# Patient Record
Sex: FEMALE | Race: BLACK OR AFRICAN AMERICAN | NOT HISPANIC OR LATINO | Employment: FULL TIME | URBAN - METROPOLITAN AREA
[De-identification: names, ages, dates, MRNs, and addresses within clinical notes are randomized per-mention and may not be internally consistent; named-entity substitution may affect disease eponyms.]

---

## 2023-04-26 ENCOUNTER — OFFICE VISIT (OUTPATIENT)
Dept: PRIMARY CARE | Facility: CLINIC | Age: 48
End: 2023-04-26
Payer: COMMERCIAL

## 2023-04-26 VITALS
BODY MASS INDEX: 39.16 KG/M2 | HEART RATE: 82 BPM | DIASTOLIC BLOOD PRESSURE: 78 MMHG | SYSTOLIC BLOOD PRESSURE: 135 MMHG | WEIGHT: 250 LBS

## 2023-04-26 DIAGNOSIS — E11.9 TYPE 2 DIABETES MELLITUS WITHOUT COMPLICATION, WITHOUT LONG-TERM CURRENT USE OF INSULIN (MULTI): Primary | ICD-10-CM

## 2023-04-26 DIAGNOSIS — E66.01 OBESITY, MORBID (MULTI): ICD-10-CM

## 2023-04-26 DIAGNOSIS — I10 ESSENTIAL HYPERTENSION: ICD-10-CM

## 2023-04-26 DIAGNOSIS — E11.9 DIABETES MELLITUS TYPE 2 WITHOUT RETINOPATHY (MULTI): ICD-10-CM

## 2023-04-26 PROBLEM — K21.9 CHRONIC GERD: Status: ACTIVE | Noted: 2023-04-26

## 2023-04-26 PROBLEM — M17.12 ARTHRITIS OF LEFT KNEE: Status: ACTIVE | Noted: 2023-04-26

## 2023-04-26 LAB
ALBUMIN (MG/L) IN URINE: 15 MG/L
ALBUMIN/CREATININE (UG/MG) IN URINE: 15.5 UG/MG CRT (ref 0–30)
CREATININE (MG/DL) IN URINE: 97 MG/DL (ref 20–320)
ESTIMATED AVERAGE GLUCOSE FOR HBA1C: 232 MG/DL
HEMOGLOBIN A1C/HEMOGLOBIN TOTAL IN BLOOD: 9.7 %

## 2023-04-26 PROCEDURE — 99214 OFFICE O/P EST MOD 30 MIN: CPT | Performed by: INTERNAL MEDICINE

## 2023-04-26 PROCEDURE — 1036F TOBACCO NON-USER: CPT | Performed by: INTERNAL MEDICINE

## 2023-04-26 PROCEDURE — 3008F BODY MASS INDEX DOCD: CPT | Performed by: INTERNAL MEDICINE

## 2023-04-26 PROCEDURE — 82043 UR ALBUMIN QUANTITATIVE: CPT

## 2023-04-26 PROCEDURE — 4010F ACE/ARB THERAPY RXD/TAKEN: CPT | Performed by: INTERNAL MEDICINE

## 2023-04-26 PROCEDURE — 82570 ASSAY OF URINE CREATININE: CPT

## 2023-04-26 PROCEDURE — 3075F SYST BP GE 130 - 139MM HG: CPT | Performed by: INTERNAL MEDICINE

## 2023-04-26 PROCEDURE — 36415 COLL VENOUS BLD VENIPUNCTURE: CPT | Performed by: INTERNAL MEDICINE

## 2023-04-26 PROCEDURE — 3078F DIAST BP <80 MM HG: CPT | Performed by: INTERNAL MEDICINE

## 2023-04-26 PROCEDURE — 83036 HEMOGLOBIN GLYCOSYLATED A1C: CPT

## 2023-04-26 PROCEDURE — 3046F HEMOGLOBIN A1C LEVEL >9.0%: CPT | Performed by: INTERNAL MEDICINE

## 2023-04-26 RX ORDER — OMEPRAZOLE 40 MG/1
1 CAPSULE, DELAYED RELEASE ORAL DAILY
COMMUNITY
Start: 2021-02-24 | End: 2023-12-21 | Stop reason: ALTCHOICE

## 2023-04-26 RX ORDER — ATORVASTATIN CALCIUM 10 MG/1
10 TABLET, FILM COATED ORAL DAILY
COMMUNITY
Start: 2021-02-24 | End: 2023-12-21 | Stop reason: ALTCHOICE

## 2023-04-26 RX ORDER — PEN NEEDLE, DIABETIC 30 GX3/16"
NEEDLE, DISPOSABLE MISCELLANEOUS
Qty: 100 EACH | Refills: 1 | Status: SHIPPED | OUTPATIENT
Start: 2023-04-26 | End: 2024-04-25

## 2023-04-26 RX ORDER — METFORMIN HYDROCHLORIDE 500 MG/1
500 TABLET ORAL
COMMUNITY
Start: 2021-01-31

## 2023-04-26 RX ORDER — LOSARTAN POTASSIUM 25 MG/1
1 TABLET ORAL DAILY
COMMUNITY
Start: 2021-02-24 | End: 2023-12-21 | Stop reason: ALTCHOICE

## 2023-04-26 ASSESSMENT — PATIENT HEALTH QUESTIONNAIRE - PHQ9
2. FEELING DOWN, DEPRESSED OR HOPELESS: NOT AT ALL
1. LITTLE INTEREST OR PLEASURE IN DOING THINGS: NOT AT ALL
SUM OF ALL RESPONSES TO PHQ9 QUESTIONS 1 AND 2: 0

## 2023-04-26 NOTE — LETTER
May 25, 2023     Bambi Jose  [address]  [facility] [address]      Dear Ms. Garcia:    Below are the results from your recent visit:    Resulted Orders   Hemoglobin A1C   Result Value Ref Range    Hemoglobin A1C 9.7 (A) %      Comment:           Diagnosis of Diabetes-Adults   Non-Diabetic: < or = 5.6%   Increased risk for developing diabetes: 5.7-6.4%   Diagnostic of diabetes: > or = 6.5%  .       Monitoring of Diabetes                Age (y)     Therapeutic Goal (%)   Adults:          >18           <7.0   Pediatrics:    13-18           <7.5                   7-12           <8.0                   0- 6            7.5-8.5   American Diabetes Association. Diabetes Care 33(S1), Jan 2010.    Estimated Average Glucose 232 MG/DL   Albumin , Urine Random   Result Value Ref Range    ALBUMIN (MG/L) IN URINE 15.0 Not Established mg/L    Albumin/Creatine Ratio 15.5 0.0 - 30.0 ug/mg crt    Creatinine, Urine 97.0 20.0 - 320.0 mg/dL     The test results show that your current treatment is working. Please {Therapies; lab letter directions:83737}. We recommend that you repeat the above test(s) in {Numbers; 1-10:93845} {Time; units w/plural:11}.    If you have any questions or concerns, please don't hesitate to call.         Sincerely,        Jake Santos, DO

## 2023-04-26 NOTE — ASSESSMENT & PLAN NOTE
Uncontrolled here for follow-up.  We will check A1c.  Advised to continue checking point-of-care glucose at home.  Its been elevated at home.  Anticipate the A1c being uncontrolled today.  We will start Ozempic in addition to the metformin.  Discussed side effects including increased risk of GI disturbances and pancreatic cancer as well as pancreatitis.  Follow-up 2 months

## 2023-04-26 NOTE — PROGRESS NOTES
Subjective   Patient ID: Bambi Garcia is a 47 y.o. female who presents for Follow-up (Patient wanted to discuss wt loss options ).    HPI     Patient is a 47-year-old female with past medical history of hypertension chronic GERD dyslipidemia obesity and diabetes mellitus type 2 presents for follow-up    Diabetes mellitus type 2.  Currently uncontrolled.  No increased thirst or increased urination.  She is compliant with metformin.  Point-of-care glucose of but has been consistently above 150 at home.  She tries to stick with a low carbohydrate diet.  No regular exercise.  No significant weight reduction at this time.      Hypertension well-controlled on current medications denies headache changes in vision orthopnea.  No side effects of medications and no compliance issues.    Chronic GERD stable controlled on Prilosec.        Review of Systems  Constitutional: No fever or chills  Cardiovascular: no chest pain, no palpitations and no syncope.   Respiratory: no cough, no shortness of breath during exertion and no shortness of breath at rest.   Gastrointestinal: no abdominal pain, no nausea and no vomiting.  Neuro: No Headache, no dizziness    Objective   /78   Pulse 82   Wt 113 kg (250 lb)   BMI 39.16 kg/m²     Physical Exam  Constitutional: Alert and in no acute distress. Well developed, well nourished  Head and Face: Head and face: Normal.    Cardiovascular: Heart rate and rhythm were normal, normal S1 and S2. No peripheral edema.   Pulmonary: No respiratory distress. Clear bilateral breath sounds.  Musculoskeletal: Gait and station: Normal. Muscle strength/tone: Normal.   Skin: Normal skin color and pigmentation, normal skin turgor, and no rash.    Psychiatric: Judgment and insight: Intact. Mood and affect: Normal.        Lab Results   Component Value Date    WBC 9.6 11/30/2022    HGB 13.4 11/30/2022    HCT 41.1 11/30/2022     11/30/2022    CHOL 211 (H) 11/30/2022    TRIG 96 11/30/2022    HDL  57.8 11/30/2022    ALT 11 11/30/2022    AST 14 11/30/2022     11/30/2022    K 4.5 11/30/2022     11/30/2022    CREATININE 0.72 11/30/2022    BUN 12 11/30/2022    CO2 23 11/30/2022    TSH 1.61 01/21/2021    HGBA1C 9.3 (A) 11/30/2022       BI mammo bilateral screening tomosynthesis  MRN: [id number]  Patient Name: KRISTEN MALCOLM     STUDY:  DIGITAL MAMM SCREENING W/ DEISY;  12/21/2022 12:10 pm     ACCESSION NUMBER(S):  [id number]     ORDERING CLINICIAN:  LESLIE KESSLER     INDICATION:  Screening.     COMPARISON:  01/26/2021     FINDINGS:  2D and tomosynthesis images were reviewed at 1 mm slice thickness.     There are areas of scattered fibroglandular tissue.  There is a focal  asymmetry in the left upper inner quadrant of the breast. No  suspicious calcifications are identified.     IMPRESSION:  Focal asymmetry in the left breast.     Further evaluation with diagnostic left breast mammogram and left  breast ultrasound is suggested     BI-RADS CATEGORY:     Category: 0 - Incomplete; Need Additional Imaging Evaluation.  Recommendation: Additional Views with diagnostic left breast  mammogram and left breast ultrasound.     For any future breast imaging appointments, please call [phone]).                        Assessment/Plan   Problem List Items Addressed This Visit          Circulatory    Essential hypertension     Controlled on current medications.  No medication adjustments            Endocrine/Metabolic    Diabetes (CMS/HCC) - Primary    Relevant Medications    semaglutide (Ozempic) 0.25 mg or 0.5 mg(2 mg/1.5 mL) pen injector    Other Relevant Orders    Hemoglobin A1C    Albumin , Urine Random    Diabetes mellitus type 2 without retinopathy (CMS/HCC)     Uncontrolled here for follow-up.  We will check A1c.  Advised to continue checking point-of-care glucose at home.  Its been elevated at home.  Anticipate the A1c being uncontrolled today.  We will start Ozempic in addition to the metformin.   Discussed side effects including increased risk of GI disturbances and pancreatic cancer as well as pancreatitis.  Follow-up 2 months         Obesity, morbid (CMS/HCC)     Plan to start Ozempic.  Encouraged regular exercise and low calorie diet

## 2023-07-20 ENCOUNTER — OFFICE VISIT (OUTPATIENT)
Dept: PRIMARY CARE | Facility: CLINIC | Age: 48
End: 2023-07-20
Payer: COMMERCIAL

## 2023-07-20 VITALS
SYSTOLIC BLOOD PRESSURE: 115 MMHG | HEART RATE: 92 BPM | OXYGEN SATURATION: 96 % | BODY MASS INDEX: 38.19 KG/M2 | HEIGHT: 68 IN | WEIGHT: 252 LBS | DIASTOLIC BLOOD PRESSURE: 75 MMHG

## 2023-07-20 DIAGNOSIS — E11.9 TYPE 2 DIABETES MELLITUS WITHOUT COMPLICATION, WITHOUT LONG-TERM CURRENT USE OF INSULIN (MULTI): ICD-10-CM

## 2023-07-20 DIAGNOSIS — I10 ESSENTIAL HYPERTENSION: ICD-10-CM

## 2023-07-20 DIAGNOSIS — E11.9 DIABETES MELLITUS TYPE 2 WITHOUT RETINOPATHY (MULTI): Primary | ICD-10-CM

## 2023-07-20 PROCEDURE — 99214 OFFICE O/P EST MOD 30 MIN: CPT | Performed by: INTERNAL MEDICINE

## 2023-07-20 PROCEDURE — 1036F TOBACCO NON-USER: CPT | Performed by: INTERNAL MEDICINE

## 2023-07-20 PROCEDURE — 3008F BODY MASS INDEX DOCD: CPT | Performed by: INTERNAL MEDICINE

## 2023-07-20 PROCEDURE — 3074F SYST BP LT 130 MM HG: CPT | Performed by: INTERNAL MEDICINE

## 2023-07-20 PROCEDURE — 3078F DIAST BP <80 MM HG: CPT | Performed by: INTERNAL MEDICINE

## 2023-07-20 PROCEDURE — 3046F HEMOGLOBIN A1C LEVEL >9.0%: CPT | Performed by: INTERNAL MEDICINE

## 2023-07-20 PROCEDURE — 4010F ACE/ARB THERAPY RXD/TAKEN: CPT | Performed by: INTERNAL MEDICINE

## 2023-07-20 ASSESSMENT — ENCOUNTER SYMPTOMS
LOSS OF SENSATION IN FEET: 0
DEPRESSION: 0
OCCASIONAL FEELINGS OF UNSTEADINESS: 0

## 2023-07-20 ASSESSMENT — PATIENT HEALTH QUESTIONNAIRE - PHQ9
SUM OF ALL RESPONSES TO PHQ9 QUESTIONS 1 AND 2: 0
2. FEELING DOWN, DEPRESSED OR HOPELESS: NOT AT ALL
1. LITTLE INTEREST OR PLEASURE IN DOING THINGS: NOT AT ALL

## 2023-07-20 NOTE — ASSESSMENT & PLAN NOTE
Likely uncontrolled as she has not been taking her medications as prescribed.  We will refill the Ozempic and check A1c next visit.  Explained to the patient she should be taking 0.25 mg first 4 weeks.  Then increase to 0.5 mg.  If you cannot get refills on the prescription please let this provider know.  Advise low carbohydrate diet and we will recheck A1c next visit.  Follow-up 2 months

## 2023-07-20 NOTE — PROGRESS NOTES
"Subjective   Patient ID: Bambi Garcia is a 47 y.o. female who presents for Follow-up.    HPI     Patient is a 47-year-old female with past medical history of GERD diabetes and hypertension who presents for follow-up.    Patient has uncontrolled diabetes mellitus type 2.  She is supposed to be taking Ozempic however she took it for the first 6 weeks and stopped.  Is unclear as to why.  She states she did not have any refills.  She never reached out to the provider for refills.  She has not been back since the initial prescription.  She been taking her metformin however.  Her last A1c was greater than 9.  She has not been checking her sugars at home.  She tries to eat a low carbohydrate diet.    Hypertension currently well controlled on current medications denies headache changes in vision orthopnea    Dyslipidemia on statin therapy.  Due for LDL recheck next visit    Review of Systems  Constitutional: No fever or chills  Cardiovascular: no chest pain, no palpitations and no syncope.   Respiratory: no cough, no shortness of breath during exertion and no shortness of breath at rest.   Gastrointestinal: no abdominal pain, no nausea and no vomiting.  Neuro: No Headache, no dizziness    Objective   /75   Pulse 92   Ht 1.727 m (5' 8\")   Wt 114 kg (252 lb)   SpO2 96%   BMI 38.32 kg/m²     Physical Exam  Constitutional: Alert and in no acute distress. Well developed, well nourished  Head and Face: Head and face: Normal.    Cardiovascular: Heart rate and rhythm were normal, normal S1 and S2. No peripheral edema.   Pulmonary: No respiratory distress. Clear bilateral breath sounds.  Musculoskeletal: Gait and station: Normal. Muscle strength/tone: Normal.   Skin: Normal skin color and pigmentation, normal skin turgor, and no rash.    Psychiatric: Judgment and insight: Intact. Mood and affect: Normal.        Lab Results   Component Value Date    WBC 9.6 11/30/2022    HGB 13.4 11/30/2022    HCT 41.1 11/30/2022    PLT " 428 11/30/2022    CHOL 211 (H) 11/30/2022    TRIG 96 11/30/2022    HDL 57.8 11/30/2022    ALT 11 11/30/2022    AST 14 11/30/2022     11/30/2022    K 4.5 11/30/2022     11/30/2022    CREATININE 0.72 11/30/2022    BUN 12 11/30/2022    CO2 23 11/30/2022    TSH 1.61 01/21/2021    HGBA1C 9.7 (A) 04/26/2023       OUTSIDE IMAGING SCAN  Ordered by an unspecified provider.            Assessment/Plan   Problem List Items Addressed This Visit          Cardiac and Vasculature    Essential hypertension     Controlled at this time on current medications.  No medication adjustments.            Endocrine/Metabolic    Diabetes (CMS/HCC)    Relevant Medications    semaglutide (Ozempic) 0.25 mg or 0.5 mg(2 mg/1.5 mL) pen injector    Diabetes mellitus type 2 without retinopathy (CMS/HCC) - Primary     Likely uncontrolled as she has not been taking her medications as prescribed.  We will refill the Ozempic and check A1c next visit.  Explained to the patient she should be taking 0.25 mg first 4 weeks.  Then increase to 0.5 mg.  If you cannot get refills on the prescription please let this provider know.  Advise low carbohydrate diet and we will recheck A1c next visit.  Follow-up 2 months

## 2023-08-29 PROBLEM — E11.65 UNCONTROLLED TYPE 2 DIABETES MELLITUS WITH HYPERGLYCEMIA (MULTI): Status: ACTIVE | Noted: 2023-08-29

## 2023-08-29 PROBLEM — M25.562 CHRONIC PAIN OF LEFT KNEE: Status: ACTIVE | Noted: 2023-08-29

## 2023-08-29 PROBLEM — G89.29 CHRONIC PAIN OF LEFT KNEE: Status: ACTIVE | Noted: 2023-08-29

## 2023-08-29 PROBLEM — N92.6 IRREGULAR BLEEDING: Status: ACTIVE | Noted: 2023-08-29

## 2023-08-29 PROBLEM — R42 DIZZINESS: Status: ACTIVE | Noted: 2023-08-29

## 2023-08-29 PROBLEM — N63.0 BREAST NODULE: Status: ACTIVE | Noted: 2023-08-29

## 2023-08-29 RX ORDER — LANCETS
EACH MISCELLANEOUS
COMMUNITY

## 2023-08-29 RX ORDER — IBUPROFEN 200 MG
CAPSULE ORAL
COMMUNITY
Start: 2021-02-24

## 2023-08-29 RX ORDER — ISOPROPYL ALCOHOL 70 ML/100ML
SWAB TOPICAL
COMMUNITY

## 2023-08-29 RX ORDER — METFORMIN HYDROCHLORIDE 500 MG/1
1000 TABLET ORAL 2 TIMES DAILY
COMMUNITY
End: 2023-10-05 | Stop reason: SDUPTHER

## 2023-10-05 ENCOUNTER — OFFICE VISIT (OUTPATIENT)
Dept: PODIATRY | Facility: CLINIC | Age: 48
End: 2023-10-05
Payer: COMMERCIAL

## 2023-10-05 DIAGNOSIS — M79.671 PAIN IN BOTH FEET: Primary | ICD-10-CM

## 2023-10-05 DIAGNOSIS — M20.5X2 ACQUIRED ADDUCTOVARUS ROTATION OF TOE OF LEFT FOOT: ICD-10-CM

## 2023-10-05 DIAGNOSIS — M21.42 PES PLANUS OF BOTH FEET: ICD-10-CM

## 2023-10-05 DIAGNOSIS — M21.611 HALLUX VALGUS WITH BUNIONS OF RIGHT FOOT: ICD-10-CM

## 2023-10-05 DIAGNOSIS — R26.89 GAIT, ANTALGIC: ICD-10-CM

## 2023-10-05 DIAGNOSIS — M79.672 PAIN IN BOTH FEET: Primary | ICD-10-CM

## 2023-10-05 DIAGNOSIS — M20.11 HALLUX VALGUS WITH BUNIONS OF RIGHT FOOT: ICD-10-CM

## 2023-10-05 DIAGNOSIS — M21.41 PES PLANUS OF BOTH FEET: ICD-10-CM

## 2023-10-05 DIAGNOSIS — M20.5X2 ADDUCTOVARUS ROTATION OF TOE, ACQUIRED, LEFT: ICD-10-CM

## 2023-10-05 PROCEDURE — 1036F TOBACCO NON-USER: CPT | Performed by: PODIATRIST

## 2023-10-05 PROCEDURE — 99204 OFFICE O/P NEW MOD 45 MIN: CPT | Performed by: PODIATRIST

## 2023-10-05 PROCEDURE — 3046F HEMOGLOBIN A1C LEVEL >9.0%: CPT | Performed by: PODIATRIST

## 2023-10-05 PROCEDURE — 3008F BODY MASS INDEX DOCD: CPT | Performed by: PODIATRIST

## 2023-10-05 PROCEDURE — 4010F ACE/ARB THERAPY RXD/TAKEN: CPT | Performed by: PODIATRIST

## 2023-10-05 ASSESSMENT — ENCOUNTER SYMPTOMS
PSYCHIATRIC NEGATIVE: 1
RESPIRATORY NEGATIVE: 1
EYES NEGATIVE: 1
GASTROINTESTINAL NEGATIVE: 1
CONSTITUTIONAL NEGATIVE: 1
ENDOCRINE COMMENTS: DIABETES
NEUROLOGICAL NEGATIVE: 1

## 2023-10-05 NOTE — PROGRESS NOTES
Chief Complaint   Patient presents with    Ingrown Toenail     Left hallux    Bunions     VARINDER   New Patient is here today for IGTN left hallux and bunion pain VARINDER. Pain started over 2 years ago. Tried cutting nail, but is very painful to touch. Bunion right foot pain over 2 years, tried wearing wide shoes for comfort, but no relief.  In addition painful adductovarus fifth digits bilateral lateral.  In addition pes planus deformity bilaterally.  .  Non-smoker.    DM x 2 years.  Doesn't A1C.     Family History. Non contributory.     General/Constitutional: Alert. NAD.   Respiratory: Non labored breathing.   Psychiatric: Mood and affect normal/baseline.   HEENT: Sclera clear.   Dermatologic: Onychocryptosis left hallux both borders.  Painful to palpation. No acute inflammatory infectious process. Web spaces are dry. No ulcers no pre-ulcerative areas.  Adductovarus fifth digits bilaterally.  H defies bilateral.  Vascular: Pedal pulses are intact and symmetric including the dorsalis pedis and the posterior tibial pulses. Feet are warm to touch. No swelling appreciated either extremity.  Neurological: Alert and oriented. No acute distress. No sensory impairment bilateral.  Musculoskeletal: Strength is normal for age. No acute deficits appreciated.  Hallux valgus deformity mild pain on palpation first MTP joint right foot prominence and deviation appreciated.  Adductovarus fifth digits bilateral.      Review of Systems   Constitutional: Negative.    HENT: Negative.     Eyes: Negative.    Respiratory: Negative.     Cardiovascular:         Hypertension   Gastrointestinal: Negative.    Endocrine:        Diabetes   Musculoskeletal:         Hallux valgus  Adductovarus   Neurological: Negative.    Psychiatric/Behavioral: Negative.           General/Constitutional: Alert. NAD.   Respiratory: Non labored breathing.   Psychiatric: Mood and affect normal/baseline.   HEENT: Sclera clear. Wearing corrective  lenses.  Dermatologic: Left hallux nail chronic onychocryptosis both borders.  Painful to palpation. No acute inflammatory infectious process. Web spaces are dry. No ulcers no pre-ulcerative areas except for HD 5 left fifth digit.  Vascular: Pedal pulses are intact and symmetric including the dorsalis pedis and the posterior tibial pulses. Feet are warm to touch. No swelling appreciated either extremity.  Neurological: Alert and oriented. No acute distress. No sensory impairment bilateral.  Musculoskeletal: Strength is normal for age. No acute deficits appreciated.  Painful hallux valgus deformity appreciated the right foot.  Adductovarus fifth digit left foot.      -Today's treatment and course of therapy was discussed with the patient in detail. Patient's questions were answered. Proper foot care was discussed. This dictation was done using Dragon computer software and as such may contain grammatical errors.    -Superficial.  Hyperkeratotic areas left foot.    -X-rays ordered of the both feet to evaluate fifth digits as well as the hallux valgus.    -We will plan a matrix procedure to the left great toe on follow-up.  Slant back procedure was done today.    -Diabetic foot care discussed in detail.

## 2023-10-19 ENCOUNTER — ANCILLARY PROCEDURE (OUTPATIENT)
Dept: RADIOLOGY | Facility: CLINIC | Age: 48
End: 2023-10-19
Payer: COMMERCIAL

## 2023-10-19 DIAGNOSIS — M20.5X2 ACQUIRED ADDUCTOVARUS ROTATION OF TOE OF LEFT FOOT: ICD-10-CM

## 2023-10-19 DIAGNOSIS — M21.611 HALLUX VALGUS WITH BUNIONS OF RIGHT FOOT: ICD-10-CM

## 2023-10-19 DIAGNOSIS — M20.11 HALLUX VALGUS WITH BUNIONS OF RIGHT FOOT: ICD-10-CM

## 2023-10-19 PROCEDURE — 73630 X-RAY EXAM OF FOOT: CPT | Mod: LT

## 2023-10-19 PROCEDURE — 73630 X-RAY EXAM OF FOOT: CPT | Mod: RT

## 2023-10-19 PROCEDURE — 73630 X-RAY EXAM OF FOOT: CPT | Mod: LEFT SIDE | Performed by: RADIOLOGY

## 2023-10-19 PROCEDURE — 73630 X-RAY EXAM OF FOOT: CPT | Mod: RIGHT SIDE | Performed by: RADIOLOGY

## 2023-11-16 ENCOUNTER — OFFICE VISIT (OUTPATIENT)
Dept: PODIATRY | Facility: CLINIC | Age: 48
End: 2023-11-16
Payer: COMMERCIAL

## 2023-11-16 DIAGNOSIS — M21.611 HALLUX VALGUS WITH BUNIONS OF RIGHT FOOT: ICD-10-CM

## 2023-11-16 DIAGNOSIS — M20.5X2 ADDUCTOVARUS ROTATION OF TOE, ACQUIRED, LEFT: ICD-10-CM

## 2023-11-16 DIAGNOSIS — L60.0 ONYCHOCRYPTOSIS: Primary | ICD-10-CM

## 2023-11-16 DIAGNOSIS — M20.11 HALLUX VALGUS WITH BUNIONS OF RIGHT FOOT: ICD-10-CM

## 2023-11-16 DIAGNOSIS — M20.5X2 ACQUIRED ADDUCTOVARUS ROTATION OF TOE OF LEFT FOOT: ICD-10-CM

## 2023-11-16 DIAGNOSIS — R26.89 GAIT, ANTALGIC: ICD-10-CM

## 2023-11-16 PROCEDURE — 4010F ACE/ARB THERAPY RXD/TAKEN: CPT | Performed by: PODIATRIST

## 2023-11-16 PROCEDURE — 11750 EXCISION NAIL&NAIL MATRIX: CPT | Performed by: PODIATRIST

## 2023-11-16 PROCEDURE — 1036F TOBACCO NON-USER: CPT | Performed by: PODIATRIST

## 2023-11-16 PROCEDURE — 3008F BODY MASS INDEX DOCD: CPT | Performed by: PODIATRIST

## 2023-11-16 PROCEDURE — 99213 OFFICE O/P EST LOW 20 MIN: CPT | Performed by: PODIATRIST

## 2023-11-16 PROCEDURE — 3046F HEMOGLOBIN A1C LEVEL >9.0%: CPT | Performed by: PODIATRIST

## 2023-11-16 ASSESSMENT — ENCOUNTER SYMPTOMS
CONSTITUTIONAL NEGATIVE: 1
GASTROINTESTINAL NEGATIVE: 1
PSYCHIATRIC NEGATIVE: 1
EYES NEGATIVE: 1
NEUROLOGICAL NEGATIVE: 1
RESPIRATORY NEGATIVE: 1
ENDOCRINE COMMENTS: DIABETES

## 2023-11-30 ENCOUNTER — APPOINTMENT (OUTPATIENT)
Dept: PODIATRY | Facility: CLINIC | Age: 48
End: 2023-11-30
Payer: COMMERCIAL

## 2023-12-05 ENCOUNTER — OFFICE VISIT (OUTPATIENT)
Dept: PODIATRY | Facility: CLINIC | Age: 48
End: 2023-12-05
Payer: COMMERCIAL

## 2023-12-05 DIAGNOSIS — M20.11 HALLUX VALGUS WITH BUNIONS OF RIGHT FOOT: ICD-10-CM

## 2023-12-05 DIAGNOSIS — M79.671 PAIN IN BOTH FEET: Primary | ICD-10-CM

## 2023-12-05 DIAGNOSIS — M79.672 PAIN IN BOTH FEET: Primary | ICD-10-CM

## 2023-12-05 DIAGNOSIS — L60.0 ONYCHOCRYPTOSIS: ICD-10-CM

## 2023-12-05 DIAGNOSIS — M21.611 HALLUX VALGUS WITH BUNIONS OF RIGHT FOOT: ICD-10-CM

## 2023-12-05 DIAGNOSIS — M20.5X2 ACQUIRED ADDUCTOVARUS ROTATION OF TOE OF LEFT FOOT: ICD-10-CM

## 2023-12-05 PROCEDURE — 4010F ACE/ARB THERAPY RXD/TAKEN: CPT | Performed by: PODIATRIST

## 2023-12-05 PROCEDURE — 99213 OFFICE O/P EST LOW 20 MIN: CPT | Performed by: PODIATRIST

## 2023-12-05 PROCEDURE — 1036F TOBACCO NON-USER: CPT | Performed by: PODIATRIST

## 2023-12-05 PROCEDURE — 3008F BODY MASS INDEX DOCD: CPT | Performed by: PODIATRIST

## 2023-12-05 PROCEDURE — 3046F HEMOGLOBIN A1C LEVEL >9.0%: CPT | Performed by: PODIATRIST

## 2023-12-05 ASSESSMENT — ENCOUNTER SYMPTOMS
CONSTITUTIONAL NEGATIVE: 1
RESPIRATORY NEGATIVE: 1
GASTROINTESTINAL NEGATIVE: 1
ENDOCRINE COMMENTS: DIABETES
PSYCHIATRIC NEGATIVE: 1
NEUROLOGICAL NEGATIVE: 1
EYES NEGATIVE: 1

## 2023-12-05 NOTE — PROGRESS NOTES
Chief Complaint   Patient presents with    Ingrown Toenail     Follow up     Chief Complaint   Patient presents with    Ingrown Toenail     Follow up   Follow-up left hallux.  Doing well.  No complaints of pain.  No drainage.  Following instructions.  Adductovarus fifth digits asymptomatic at this time.  Glucose is controlled.  Generally low 100s for readings.  No changes past medical history no other problems today.    General/Constitutional: Alert. NAD.   Respiratory: Non labored breathing.   Psychiatric: Mood and affect normal/baseline.   HEENT: Sclera clear.   Dermatologic: Resolved onychocryptosis left hallux lateral border.  No pain swelling or inflammation noted.  Small amount of dried bloody exudate noted.  Otherwise progressing nicely.  Mild HD 5 appreciated.  Asymptomatic.  Vascular: Pedal pulses are intact and symmetric including the dorsalis pedis and the posterior tibial pulses. Feet are warm to touch. No swelling appreciated either extremity.  Neurological: Alert and oriented. No acute distress. No sensory impairment bilateral.  Musculoskeletal: Strength is normal for age. No acute deficits appreciated.  Hallux valgus deformity mild pain on palpation first MTP joint right foot prominence and deviation appreciated.  Baseline adductovarus fifth digits.  Asymptomatic.      Review of Systems   Constitutional: Negative.    HENT: Negative.     Eyes: Negative.    Respiratory: Negative.     Cardiovascular:         Hypertension   Gastrointestinal: Negative.    Endocrine:        Diabetes   Musculoskeletal:         Hallux valgus  Adductovarus   Neurological: Negative.    Psychiatric/Behavioral: Negative.         -Today's treatment and course of therapy was discussed with the patient in detail. Patient's questions were answered. Proper foot care was discussed. This dictation was done using Dragon computer software and as such may contain grammatical errors.    -No further soaking necessary for the ingrown  nail.    -Check feet daily.  If any problems please let me know.  Reviewed diabetic foot care.    -Follow-up at the end of January if any problems before then.  No

## 2023-12-21 ENCOUNTER — OFFICE VISIT (OUTPATIENT)
Dept: OBSTETRICS AND GYNECOLOGY | Facility: CLINIC | Age: 48
End: 2023-12-21
Payer: COMMERCIAL

## 2023-12-21 VITALS
DIASTOLIC BLOOD PRESSURE: 75 MMHG | SYSTOLIC BLOOD PRESSURE: 122 MMHG | HEIGHT: 69 IN | BODY MASS INDEX: 36.29 KG/M2 | WEIGHT: 245 LBS

## 2023-12-21 DIAGNOSIS — D25.1 INTRAMURAL UTERINE FIBROID: Primary | ICD-10-CM

## 2023-12-21 DIAGNOSIS — Z01.818 VISIT FOR PRE-OPERATIVE EXAMINATION: ICD-10-CM

## 2023-12-21 PROCEDURE — 3074F SYST BP LT 130 MM HG: CPT | Performed by: OBSTETRICS & GYNECOLOGY

## 2023-12-21 PROCEDURE — 3046F HEMOGLOBIN A1C LEVEL >9.0%: CPT | Performed by: OBSTETRICS & GYNECOLOGY

## 2023-12-21 PROCEDURE — 3078F DIAST BP <80 MM HG: CPT | Performed by: OBSTETRICS & GYNECOLOGY

## 2023-12-21 PROCEDURE — 1036F TOBACCO NON-USER: CPT | Performed by: OBSTETRICS & GYNECOLOGY

## 2023-12-21 PROCEDURE — 3008F BODY MASS INDEX DOCD: CPT | Performed by: OBSTETRICS & GYNECOLOGY

## 2023-12-21 PROCEDURE — 99214 OFFICE O/P EST MOD 30 MIN: CPT | Performed by: OBSTETRICS & GYNECOLOGY

## 2023-12-21 ASSESSMENT — PAIN SCALES - GENERAL: PAINLEVEL: 0-NO PAIN

## 2023-12-21 NOTE — PROGRESS NOTES
Patient is here for preoperative visit prior to laparoscopic supracervical hysterectomy bilateral patient has no complaints procedure explained to patient in details consent for morcellation  Consent for surgery will be signing and the computer at the hospital patient informed for preparation for surgery and that she will be notified by the operating room when she needs to show up for surgery risk of complication and complication of surgery discussed including bleeding infection prolonged hospitalization bowel and bladder injury  Impression uterine fibroids menometrorrhagia  Procedure laparoscopic supracervical hysterectomy bilateral salpingectomy    PE  Lungs clear normal breath sounds  Heart S1S2 no M  Abdomen soft no significant masses  Pelvic examination deferred  Imp Uterine fibroids patient will undergo LSH BS

## 2023-12-28 ENCOUNTER — LAB (OUTPATIENT)
Dept: LAB | Facility: LAB | Age: 48
End: 2023-12-28
Payer: COMMERCIAL

## 2023-12-28 DIAGNOSIS — Z01.818 ENCOUNTER FOR OTHER PREPROCEDURAL EXAMINATION: Primary | ICD-10-CM

## 2023-12-28 LAB
ERYTHROCYTE [DISTWIDTH] IN BLOOD BY AUTOMATED COUNT: 15.5 % (ref 11.5–14.5)
HCT VFR BLD AUTO: 37.3 % (ref 36–46)
HGB BLD-MCNC: 11.7 G/DL (ref 12–16)
MCH RBC QN AUTO: 26.2 PG (ref 26–34)
MCHC RBC AUTO-ENTMCNC: 31.4 G/DL (ref 32–36)
MCV RBC AUTO: 83 FL (ref 80–100)
NRBC BLD-RTO: 0 /100 WBCS (ref 0–0)
PLATELET # BLD AUTO: 422 X10*3/UL (ref 150–450)
RBC # BLD AUTO: 4.47 X10*6/UL (ref 4–5.2)
WBC # BLD AUTO: 10.7 X10*3/UL (ref 4.4–11.3)

## 2023-12-28 PROCEDURE — 85027 COMPLETE CBC AUTOMATED: CPT

## 2023-12-28 PROCEDURE — 36415 COLL VENOUS BLD VENIPUNCTURE: CPT

## 2024-01-16 ENCOUNTER — HOSPITAL ENCOUNTER (OUTPATIENT)
Facility: HOSPITAL | Age: 49
Setting detail: OUTPATIENT SURGERY
Discharge: HOME | End: 2024-01-16
Attending: OBSTETRICS & GYNECOLOGY | Admitting: OBSTETRICS & GYNECOLOGY
Payer: COMMERCIAL

## 2024-01-16 ENCOUNTER — ANESTHESIA EVENT (OUTPATIENT)
Dept: OPERATING ROOM | Facility: HOSPITAL | Age: 49
End: 2024-01-16
Payer: COMMERCIAL

## 2024-01-16 ENCOUNTER — ANESTHESIA (OUTPATIENT)
Dept: OPERATING ROOM | Facility: HOSPITAL | Age: 49
End: 2024-01-16
Payer: COMMERCIAL

## 2024-01-16 VITALS
RESPIRATION RATE: 12 BRPM | DIASTOLIC BLOOD PRESSURE: 62 MMHG | HEIGHT: 68 IN | WEIGHT: 252.87 LBS | TEMPERATURE: 96.8 F | OXYGEN SATURATION: 100 % | BODY MASS INDEX: 38.32 KG/M2 | SYSTOLIC BLOOD PRESSURE: 138 MMHG | HEART RATE: 71 BPM

## 2024-01-16 DIAGNOSIS — R42 DIZZINESS: ICD-10-CM

## 2024-01-16 DIAGNOSIS — E66.01 OBESITY, MORBID (MULTI): ICD-10-CM

## 2024-01-16 DIAGNOSIS — D25.9 LEIOMYOMA OF UTERUS, UNSPECIFIED: ICD-10-CM

## 2024-01-16 DIAGNOSIS — K21.9 CHRONIC GERD: ICD-10-CM

## 2024-01-16 DIAGNOSIS — M17.12 ARTHRITIS OF LEFT KNEE: ICD-10-CM

## 2024-01-16 DIAGNOSIS — R10.9 POSTOPERATIVE ABDOMINAL PAIN: Primary | ICD-10-CM

## 2024-01-16 DIAGNOSIS — G89.29 CHRONIC PAIN OF LEFT KNEE: ICD-10-CM

## 2024-01-16 DIAGNOSIS — I10 ESSENTIAL HYPERTENSION: ICD-10-CM

## 2024-01-16 DIAGNOSIS — N92.6 IRREGULAR BLEEDING: ICD-10-CM

## 2024-01-16 DIAGNOSIS — E11.65 UNCONTROLLED TYPE 2 DIABETES MELLITUS WITH HYPERGLYCEMIA (MULTI): ICD-10-CM

## 2024-01-16 DIAGNOSIS — M25.562 CHRONIC PAIN OF LEFT KNEE: ICD-10-CM

## 2024-01-16 DIAGNOSIS — N63.0 BREAST NODULE: ICD-10-CM

## 2024-01-16 DIAGNOSIS — E11.9 DIABETES MELLITUS TYPE 2 WITHOUT RETINOPATHY (MULTI): ICD-10-CM

## 2024-01-16 DIAGNOSIS — G89.18 POSTOPERATIVE ABDOMINAL PAIN: Primary | ICD-10-CM

## 2024-01-16 LAB
GLUCOSE BLD MANUAL STRIP-MCNC: 186 MG/DL (ref 74–99)
GLUCOSE BLD MANUAL STRIP-MCNC: 230 MG/DL (ref 74–99)
PREGNANCY TEST URINE, POC: NEGATIVE

## 2024-01-16 PROCEDURE — 88307 TISSUE EXAM BY PATHOLOGIST: CPT | Mod: TC,SUR,AHULAB | Performed by: OBSTETRICS & GYNECOLOGY

## 2024-01-16 PROCEDURE — 2500000005 HC RX 250 GENERAL PHARMACY W/O HCPCS

## 2024-01-16 PROCEDURE — 88307 TISSUE EXAM BY PATHOLOGIST: CPT | Performed by: PATHOLOGY

## 2024-01-16 PROCEDURE — 58541 LSH UTERUS 250 G OR LESS: CPT | Performed by: OBSTETRICS & GYNECOLOGY

## 2024-01-16 PROCEDURE — 2500000004 HC RX 250 GENERAL PHARMACY W/ HCPCS (ALT 636 FOR OP/ED)

## 2024-01-16 PROCEDURE — 7100000010 HC PHASE TWO TIME - EACH INCREMENTAL 1 MINUTE: Performed by: OBSTETRICS & GYNECOLOGY

## 2024-01-16 PROCEDURE — 2500000004 HC RX 250 GENERAL PHARMACY W/ HCPCS (ALT 636 FOR OP/ED): Performed by: OBSTETRICS & GYNECOLOGY

## 2024-01-16 PROCEDURE — A58541 PR LAP, SUPRACERVIAL HYSTERECTOMY, <250G: Performed by: ANESTHESIOLOGY

## 2024-01-16 PROCEDURE — 2500000005 HC RX 250 GENERAL PHARMACY W/O HCPCS: Performed by: OBSTETRICS & GYNECOLOGY

## 2024-01-16 PROCEDURE — 2500000002 HC RX 250 W HCPCS SELF ADMINISTERED DRUGS (ALT 637 FOR MEDICARE OP, ALT 636 FOR OP/ED): Performed by: ANESTHESIOLOGY

## 2024-01-16 PROCEDURE — 2720000007 HC OR 272 NO HCPCS: Performed by: OBSTETRICS & GYNECOLOGY

## 2024-01-16 PROCEDURE — 3600000004 HC OR TIME - INITIAL BASE CHARGE - PROCEDURE LEVEL FOUR: Performed by: OBSTETRICS & GYNECOLOGY

## 2024-01-16 PROCEDURE — 81025 URINE PREGNANCY TEST: CPT | Performed by: OBSTETRICS & GYNECOLOGY

## 2024-01-16 PROCEDURE — A58541 PR LAP, SUPRACERVIAL HYSTERECTOMY, <250G

## 2024-01-16 PROCEDURE — 3700000001 HC GENERAL ANESTHESIA TIME - INITIAL BASE CHARGE: Performed by: OBSTETRICS & GYNECOLOGY

## 2024-01-16 PROCEDURE — 2500000004 HC RX 250 GENERAL PHARMACY W/ HCPCS (ALT 636 FOR OP/ED): Performed by: ANESTHESIOLOGY

## 2024-01-16 PROCEDURE — 7100000001 HC RECOVERY ROOM TIME - INITIAL BASE CHARGE: Performed by: OBSTETRICS & GYNECOLOGY

## 2024-01-16 PROCEDURE — 7100000009 HC PHASE TWO TIME - INITIAL BASE CHARGE: Performed by: OBSTETRICS & GYNECOLOGY

## 2024-01-16 PROCEDURE — 82947 ASSAY GLUCOSE BLOOD QUANT: CPT

## 2024-01-16 PROCEDURE — 3600000009 HC OR TIME - EACH INCREMENTAL 1 MINUTE - PROCEDURE LEVEL FOUR: Performed by: OBSTETRICS & GYNECOLOGY

## 2024-01-16 PROCEDURE — A4217 STERILE WATER/SALINE, 500 ML: HCPCS | Performed by: OBSTETRICS & GYNECOLOGY

## 2024-01-16 PROCEDURE — 7100000002 HC RECOVERY ROOM TIME - EACH INCREMENTAL 1 MINUTE: Performed by: OBSTETRICS & GYNECOLOGY

## 2024-01-16 PROCEDURE — 3700000002 HC GENERAL ANESTHESIA TIME - EACH INCREMENTAL 1 MINUTE: Performed by: OBSTETRICS & GYNECOLOGY

## 2024-01-16 RX ORDER — OXYCODONE HYDROCHLORIDE 5 MG/1
5 TABLET ORAL EVERY 4 HOURS PRN
Status: DISCONTINUED | OUTPATIENT
Start: 2024-01-16 | End: 2024-01-16 | Stop reason: HOSPADM

## 2024-01-16 RX ORDER — ONDANSETRON HYDROCHLORIDE 2 MG/ML
INJECTION, SOLUTION INTRAVENOUS AS NEEDED
Status: DISCONTINUED | OUTPATIENT
Start: 2024-01-16 | End: 2024-01-16

## 2024-01-16 RX ORDER — HYDRALAZINE HYDROCHLORIDE 20 MG/ML
5 INJECTION INTRAMUSCULAR; INTRAVENOUS EVERY 30 MIN PRN
Status: DISCONTINUED | OUTPATIENT
Start: 2024-01-16 | End: 2024-01-16 | Stop reason: HOSPADM

## 2024-01-16 RX ORDER — SODIUM CHLORIDE 0.9 G/100ML
IRRIGANT IRRIGATION AS NEEDED
Status: DISCONTINUED | OUTPATIENT
Start: 2024-01-16 | End: 2024-01-16 | Stop reason: HOSPADM

## 2024-01-16 RX ORDER — ROCURONIUM BROMIDE 10 MG/ML
INJECTION, SOLUTION INTRAVENOUS AS NEEDED
Status: DISCONTINUED | OUTPATIENT
Start: 2024-01-16 | End: 2024-01-16

## 2024-01-16 RX ORDER — LIDOCAINE HYDROCHLORIDE 10 MG/ML
0.1 INJECTION, SOLUTION EPIDURAL; INFILTRATION; INTRACAUDAL; PERINEURAL ONCE
Status: DISCONTINUED | OUTPATIENT
Start: 2024-01-16 | End: 2024-01-16 | Stop reason: HOSPADM

## 2024-01-16 RX ORDER — OXYCODONE HYDROCHLORIDE 5 MG/1
5 TABLET ORAL EVERY 6 HOURS PRN
Qty: 20 TABLET | Refills: 0 | Status: SHIPPED | OUTPATIENT
Start: 2024-01-16

## 2024-01-16 RX ORDER — CEFAZOLIN 1 G/1
INJECTION, POWDER, FOR SOLUTION INTRAVENOUS AS NEEDED
Status: DISCONTINUED | OUTPATIENT
Start: 2024-01-16 | End: 2024-01-16

## 2024-01-16 RX ORDER — ACETAMINOPHEN 325 MG/1
650 TABLET ORAL EVERY 6 HOURS PRN
Qty: 20 TABLET | Refills: 0 | Status: SHIPPED | OUTPATIENT
Start: 2024-01-16

## 2024-01-16 RX ORDER — IBUPROFEN 600 MG/1
600 TABLET ORAL EVERY 6 HOURS PRN
Qty: 20 TABLET | Refills: 0 | Status: SHIPPED | OUTPATIENT
Start: 2024-01-16

## 2024-01-16 RX ORDER — ALBUTEROL SULFATE 0.83 MG/ML
2.5 SOLUTION RESPIRATORY (INHALATION) ONCE AS NEEDED
Status: DISCONTINUED | OUTPATIENT
Start: 2024-01-16 | End: 2024-01-16 | Stop reason: HOSPADM

## 2024-01-16 RX ORDER — PROPOFOL 10 MG/ML
INJECTION, EMULSION INTRAVENOUS AS NEEDED
Status: DISCONTINUED | OUTPATIENT
Start: 2024-01-16 | End: 2024-01-16

## 2024-01-16 RX ORDER — LIDOCAINE HYDROCHLORIDE 20 MG/ML
INJECTION, SOLUTION INFILTRATION; PERINEURAL AS NEEDED
Status: DISCONTINUED | OUTPATIENT
Start: 2024-01-16 | End: 2024-01-16

## 2024-01-16 RX ORDER — APREPITANT 40 MG/1
CAPSULE ORAL
Status: COMPLETED
Start: 2024-01-16 | End: 2024-01-16

## 2024-01-16 RX ORDER — BUPIVACAINE HYDROCHLORIDE 5 MG/ML
INJECTION, SOLUTION PERINEURAL AS NEEDED
Status: DISCONTINUED | OUTPATIENT
Start: 2024-01-16 | End: 2024-01-16 | Stop reason: HOSPADM

## 2024-01-16 RX ORDER — SODIUM CHLORIDE, SODIUM LACTATE, POTASSIUM CHLORIDE, CALCIUM CHLORIDE 600; 310; 30; 20 MG/100ML; MG/100ML; MG/100ML; MG/100ML
100 INJECTION, SOLUTION INTRAVENOUS CONTINUOUS
Status: DISCONTINUED | OUTPATIENT
Start: 2024-01-16 | End: 2024-01-16 | Stop reason: HOSPADM

## 2024-01-16 RX ORDER — SODIUM CHLORIDE, SODIUM LACTATE, POTASSIUM CHLORIDE, CALCIUM CHLORIDE 600; 310; 30; 20 MG/100ML; MG/100ML; MG/100ML; MG/100ML
INJECTION, SOLUTION INTRAVENOUS CONTINUOUS PRN
Status: DISCONTINUED | OUTPATIENT
Start: 2024-01-16 | End: 2024-01-16

## 2024-01-16 RX ORDER — APREPITANT 40 MG/1
CAPSULE ORAL AS NEEDED
Status: DISCONTINUED | OUTPATIENT
Start: 2024-01-16 | End: 2024-01-16

## 2024-01-16 RX ORDER — ONDANSETRON HYDROCHLORIDE 2 MG/ML
4 INJECTION, SOLUTION INTRAVENOUS ONCE AS NEEDED
Status: COMPLETED | OUTPATIENT
Start: 2024-01-16 | End: 2024-01-16

## 2024-01-16 RX ORDER — HYDROMORPHONE HYDROCHLORIDE 1 MG/ML
INJECTION, SOLUTION INTRAMUSCULAR; INTRAVENOUS; SUBCUTANEOUS AS NEEDED
Status: DISCONTINUED | OUTPATIENT
Start: 2024-01-16 | End: 2024-01-16

## 2024-01-16 RX ORDER — ONDANSETRON 4 MG/1
4 TABLET, FILM COATED ORAL EVERY 6 HOURS PRN
Qty: 20 TABLET | Refills: 0 | Status: SHIPPED | OUTPATIENT
Start: 2024-01-16

## 2024-01-16 RX ORDER — LABETALOL HYDROCHLORIDE 5 MG/ML
5 INJECTION, SOLUTION INTRAVENOUS ONCE AS NEEDED
Status: DISCONTINUED | OUTPATIENT
Start: 2024-01-16 | End: 2024-01-16 | Stop reason: HOSPADM

## 2024-01-16 RX ORDER — MIDAZOLAM HYDROCHLORIDE 1 MG/ML
INJECTION INTRAMUSCULAR; INTRAVENOUS AS NEEDED
Status: DISCONTINUED | OUTPATIENT
Start: 2024-01-16 | End: 2024-01-16

## 2024-01-16 RX ORDER — ASPIRIN 81 MG
100 TABLET, DELAYED RELEASE (ENTERIC COATED) ORAL 2 TIMES DAILY
Qty: 10 TABLET | Refills: 0 | Status: SHIPPED | OUTPATIENT
Start: 2024-01-16 | End: 2024-01-21

## 2024-01-16 RX ORDER — FENTANYL CITRATE 50 UG/ML
INJECTION, SOLUTION INTRAMUSCULAR; INTRAVENOUS AS NEEDED
Status: DISCONTINUED | OUTPATIENT
Start: 2024-01-16 | End: 2024-01-16

## 2024-01-16 RX ADMIN — FENTANYL CITRATE 50 MCG: 50 INJECTION, SOLUTION INTRAMUSCULAR; INTRAVENOUS at 13:14

## 2024-01-16 RX ADMIN — CEFAZOLIN 2 G: 1 INJECTION, POWDER, FOR SOLUTION INTRAMUSCULAR; INTRAVENOUS at 13:30

## 2024-01-16 RX ADMIN — PROPOFOL 20 MG: 10 INJECTION, EMULSION INTRAVENOUS at 15:12

## 2024-01-16 RX ADMIN — SODIUM CHLORIDE, POTASSIUM CHLORIDE, SODIUM LACTATE AND CALCIUM CHLORIDE: 600; 310; 30; 20 INJECTION, SOLUTION INTRAVENOUS at 13:03

## 2024-01-16 RX ADMIN — ROCURONIUM BROMIDE 60 MG: 10 INJECTION, SOLUTION INTRAVENOUS at 13:15

## 2024-01-16 RX ADMIN — HYDROMORPHONE HYDROCHLORIDE 0.2 MG: 1 INJECTION, SOLUTION INTRAMUSCULAR; INTRAVENOUS; SUBCUTANEOUS at 14:51

## 2024-01-16 RX ADMIN — ONDANSETRON 4 MG: 2 INJECTION INTRAMUSCULAR; INTRAVENOUS at 15:00

## 2024-01-16 RX ADMIN — SUGAMMADEX 200 MG: 100 INJECTION, SOLUTION INTRAVENOUS at 15:09

## 2024-01-16 RX ADMIN — ONDANSETRON 4 MG: 2 INJECTION INTRAMUSCULAR; INTRAVENOUS at 16:22

## 2024-01-16 RX ADMIN — FENTANYL CITRATE 50 MCG: 50 INJECTION, SOLUTION INTRAMUSCULAR; INTRAVENOUS at 13:46

## 2024-01-16 RX ADMIN — ROCURONIUM BROMIDE 10 MG: 10 INJECTION, SOLUTION INTRAVENOUS at 14:11

## 2024-01-16 RX ADMIN — HYDROMORPHONE HYDROCHLORIDE 0.4 MG: 1 INJECTION, SOLUTION INTRAMUSCULAR; INTRAVENOUS; SUBCUTANEOUS at 15:14

## 2024-01-16 RX ADMIN — PROPOFOL 180 MG: 10 INJECTION, EMULSION INTRAVENOUS at 13:14

## 2024-01-16 RX ADMIN — APREPITANT 40 MG: 40 CAPSULE ORAL at 12:30

## 2024-01-16 RX ADMIN — ROCURONIUM BROMIDE 10 MG: 10 INJECTION, SOLUTION INTRAVENOUS at 14:38

## 2024-01-16 RX ADMIN — HYDROMORPHONE HYDROCHLORIDE 0.4 MG: 1 INJECTION, SOLUTION INTRAMUSCULAR; INTRAVENOUS; SUBCUTANEOUS at 14:03

## 2024-01-16 RX ADMIN — MIDAZOLAM HYDROCHLORIDE 2 MG: 1 INJECTION, SOLUTION INTRAMUSCULAR; INTRAVENOUS at 13:03

## 2024-01-16 RX ADMIN — LIDOCAINE HYDROCHLORIDE 80 MG: 20 INJECTION, SOLUTION INFILTRATION; PERINEURAL at 13:14

## 2024-01-16 ASSESSMENT — PAIN SCALES - GENERAL
PAINLEVEL_OUTOF10: 0 - NO PAIN
PAINLEVEL_OUTOF10: 0 - NO PAIN
PAINLEVEL_OUTOF10: 4
PAINLEVEL_OUTOF10: 0 - NO PAIN
PAINLEVEL_OUTOF10: 4
PAINLEVEL_OUTOF10: 4
PAIN_LEVEL: 0
PAINLEVEL_OUTOF10: 4
PAINLEVEL_OUTOF10: 0 - NO PAIN
PAINLEVEL_OUTOF10: 4

## 2024-01-16 ASSESSMENT — PAIN - FUNCTIONAL ASSESSMENT
PAIN_FUNCTIONAL_ASSESSMENT: WONG-BAKER FACES
PAIN_FUNCTIONAL_ASSESSMENT: 0-10
PAIN_FUNCTIONAL_ASSESSMENT: 0-10
PAIN_FUNCTIONAL_ASSESSMENT: WONG-BAKER FACES
PAIN_FUNCTIONAL_ASSESSMENT: 0-10
PAIN_FUNCTIONAL_ASSESSMENT: 0-10
PAIN_FUNCTIONAL_ASSESSMENT: WONG-BAKER FACES
PAIN_FUNCTIONAL_ASSESSMENT: 0-10

## 2024-01-16 ASSESSMENT — PAIN SCALES - WONG BAKER
WONGBAKER_NUMERICALRESPONSE: NO HURT

## 2024-01-16 ASSESSMENT — COLUMBIA-SUICIDE SEVERITY RATING SCALE - C-SSRS
2. HAVE YOU ACTUALLY HAD ANY THOUGHTS OF KILLING YOURSELF?: NO
6. HAVE YOU EVER DONE ANYTHING, STARTED TO DO ANYTHING, OR PREPARED TO DO ANYTHING TO END YOUR LIFE?: NO
1. IN THE PAST MONTH, HAVE YOU WISHED YOU WERE DEAD OR WISHED YOU COULD GO TO SLEEP AND NOT WAKE UP?: NO

## 2024-01-16 NOTE — ANESTHESIA PROCEDURE NOTES
Airway  Date/Time: 1/16/2024 1:17 PM  Urgency: elective    Airway not difficult    Staffing  Performed: STAN   Authorized by: Jaret Ta MD    Performed by: STAN Crocker  Patient location during procedure: OR    Indications and Patient Condition  Indications for airway management: anesthesia and airway protection  Spontaneous Ventilation: absent  Sedation level: deep  Preoxygenated: yes  Patient position: ramp  MILS not maintained throughout  Mask difficulty assessment: 1 - vent by mask  Planned trial extubation    Final Airway Details  Final airway type: endotracheal airway      Successful airway: ETT  Cuffed: yes   Successful intubation technique: direct laryngoscopy  Blade: Luci  Blade size: #3  ETT size (mm): 7.0  Cormack-Lehane Classification: grade IIa - partial view of glottis (With cricoid pressure)  Placement verified by: chest auscultation and capnometry   Measured from: lips  ETT to lips (cm): 23  Number of attempts at approach: 1

## 2024-01-16 NOTE — POST-PROCEDURE NOTE
1525: Patient arrival to PACU, report received. Care assumed for patient at this time.    1536: Post op blood glucose is 136.    1539: Patient's family updated via Epic text family notification.    1600: Patient tolerating sips of ginger ale.    1622: One time dose Zofran 4 mg IV given for nausea per order.    1630: Patient's family updated via telephone    1723: Patient remains lethargic, VSS. Patient repositioned to sitting position in chair.    1812: This RN contacted Dr. Ta about patient's continued post-op lethargy. Dr. Ta at bedside evaluating patient. No new orders placed at this time.     1825: Patient more alert, follows commands. Patient assisted to bathroom with help of RN, urinated prior to discharge without complication. Patient tolerating sips of water and crackers.    1830: Criteria met for patient to transition to phase 2.    1904: Discharge instructions reviewed with patient and family, verbalized understanding. All questions answered.    1910: PIV removed, catheter intact    1915: Patient to main lobby for discharge via transport in stable condition, with all belongings.

## 2024-01-16 NOTE — ANESTHESIA PREPROCEDURE EVALUATION
Patient: Bambi Garcia    Procedure Information       Date/Time: 01/16/24 1130    Procedure: Laparoscopic Supracervical Hysterectomy; Bilateral Salpingectomy    Location: [facility] OR 09 / [facility]    Surgeons: Andrew Mcnamara MD          47 yo F hx obesity, fibroids, Dm2.  Neg HCG    Relevant Problems   Cardiovascular   (+) Essential hypertension      Endocrine   (+) Diabetes mellitus type 2 without retinopathy (CMS/HCC)   (+) Obesity, morbid (CMS/HCC)      GI   (+) Chronic GERD      Other   (+) Arthritis of left knee       Clinical information reviewed:   Tobacco  Allergies  Meds   Med Hx  Surg Hx   Fam Hx  Soc Hx        NPO Detail:  NPO/Void Status  Date of Last Liquid: 01/16/24  Time of Last Liquid: 0721  Date of Last Solid: 01/15/24  Time of Last Solid: 2200         Physical Exam    Airway  Mallampati: III  TM distance: >3 FB  Neck ROM: full  Comments: Prominent teeth   Cardiovascular   Rate: normal     Dental - normal exam     Pulmonary - normal exam     Abdominal            Anesthesia Plan    History of general anesthesia?: yes  History of complications of general anesthesia?: no    ASA 2     general     intravenous induction   Postoperative administration of opioids is intended.  Anesthetic plan and risks discussed with patient.

## 2024-01-16 NOTE — OP NOTE
Laparoscopic Supracervical Hysterectomy; Bilateral Salpingectomy Operative Note     Date: 2024  OR Location: [facility] OR    Name: Bambi Garcia, : 1975, Age: 48 y.o., MRN: [id number], Sex: female    Diagnosis  Pre-op Diagnosis     * Leiomyoma of uterus, unspecified [D25.9] Post-op Diagnosis     * Leiomyoma of uterus, unspecified [D25.9]     Procedures  Laparoscopic Supracervical Hysterectomy; Bilateral Salpingectomy  38099 - [place] LAPAROSCOPY SUPRACERVICAL HYSTERECTOMY 250 GM/<      Surgeons      * Andrew Mcnamara - Primary    Resident/Fellow/Other Assistant:  Surgeon(s) and Role:    Procedure Summary  Anesthesia: General  ASA: II  Anesthesia Staff: Anesthesiologist: Jaret Ta MD  C-AA: STAN Crocker  Estimated Blood Loss: 50mL  Intra-op Medications:   Medication Name Total Dose   aprepitant (Emend) capsule  - Omnicell Override Pull Cannot be calculated              Anesthesia Record               Intraprocedure I/O Totals       None           Specimen: No specimens collected     Staff:   Circulator: Bev EDWARD RN  Scrub Person: Allie Tirado         Drains and/or Catheters:   Urethral Catheter Non-latex 16 Fr. (Active)       Tourniquet Times:         Implants:     Findings: Anterior calcified fibroid.  Normal ovaries and tubes.  IUD removed without difficulty    Indications: Bambi Garcia is an 48 y.o. female who is having surgery for Leiomyoma of uterus, unspecified [D25.9].     The patient was seen in the preoperative area. The risks, benefits, complications, treatment options, non-operative alternatives, expected recovery and outcomes were discussed with the patient. The possibilities of reaction to medication, pulmonary aspiration, injury to surrounding structures, bleeding, recurrent infection, the need for additional procedures, failure to diagnose a condition, and creating a complication requiring transfusion or operation were discussed with the patient. The  patient concurred with the proposed plan, giving informed consent.  The site of surgery was properly noted/marked if necessary per policy. The patient has been actively warmed in preoperative area. Preoperative antibiotics have been ordered and given within 1 hours of incision. Venous thrombosis prophylaxis have been ordered including bilateral sequential compression devices    Procedure Details:   Patient was advised and consented and brought to the operating room for robotic hysterectomy bilateral salpingectomy and cystoscopy.  She was given general endotracheal anesthesia placed into the dorsolithotomy position and prepped and draped in usual fashion.  Jj catheter placed into the bladder and OG tube in the stomach.  [place] speculum placed into the vagina and a Corrales retractor was used to visualize the cervix and a single-tooth tenaculum placed onto the anterior lip of the cervix.  These IUD was removed without difficulty.  The internal cervical os was stenotic and we are unable to access the uterine cavity.  We proceeded with the laparoscopic portion and came back to placement of the uterine manipulator.  Marcaine was injected at Trejo's point and an 8 mm incision was made through which a Veress needle was passed into the abdominal cavity and after confirmation of intra-abdominal position 3 L of CO2 gas were insufflated.  Veress needle was withdrawn and an 8 mm trocar and sheath inserted in the abdominal cavity laparoscope was inserted the right upper quadrant appeared normal the abdominal cavity appeared normal she was placed deep in Trella steep Trendelenburg position a 8 mm trocar and sheath inserted superior to the umbilicus as well as in the right flank.  A 5 mm assistant port was placed in the left upper quadrant.  Bowel was moved out of the pelvis.  There was an anterior fibroid which we grasped and brought traction.  Attention then back to the vagina where we were able to pass a uterine sound which  sounded to 8 cm and a [employer] uterine manipulator placed within the uterus and a 35 mm Koh cup placed over the cervix.  Attention then turned back to the abdomen where the robot was docked and using the vessel sealer and scissors we coagulated and divided the left round ligament.  Coagulated and divided the left fallopian tube as well as the ovarian uterine ligament.  Open the broad ligament.  Created a bladder flap and open the posterior peritoneum to the level of the uterine artery and uterosacral ligament.  We then coagulated and divided the left uterine artery and this was all repeated on the right-hand side bladder was fully post off the anterior surface of the Koh cup.  We skeletonized the peritoneum throughout the circumferential area of the Koh cup.  Then using 35 mm cut we open the colpotomy and the specimen was removed without difficulty.  Some bleeding on the right ankle was coag coagulated with the force to bipolar.  The cuff was then closed with 2 oh V-Loc suture in a running fashion and double backed and cut flush to the.  Vaginal cuff.  Pelvis was thoroughly irrigated hemostasis achieved with some coagulation of a bleeding area on the left pelvic sidewall with laparoscopic scissors.  Irrigated suctioned dry hemostasis confirmed.  I then used a vessel sealer to cool coagulate and divide the mesosalpinx and both tubes are stored in the right lower quadrant.  The robot was undocked we remove the fallopian tubes as well as the suture through an 8 mm trocar.Pelvis was again suctioned dry and Chandler was placed over the vaginal cuff.  Instrument drawn from the abdomen pneumoperitoneum was allowed to escape and skin was closed with 4-0 Vicryl in a subcuticular fashion and Steri-Strips applied.  Bladder was then backfilled with approximately 200 cc of saline fluorescein and been injected intravenously and we inspected the bladder.  No bladder injury occurred and bilateral jets documented.  The urine was  allowed to drain and left approximately 100 cc of urine in the bladder.  Jj catheter removed patient taken out of the dorsolithotomy position awoken transferred to the recovery room stable condition\  Anticipate discharge to home on Motrin Tylenol Percocet follow-up in 2 weeks      Complications:  None; patient tolerated the procedure well.    Disposition: PACU - hemodynamically stable.  Condition: stable         Additional Details:     Attending Attestation: I was present for the entire procedure.    Andrew Mcnamara  Phone Number: [phone]

## 2024-01-16 NOTE — OP NOTE
Laparoscopic Supracervical Hysterectomy Operative Note     Date: 2024  OR Location: [facility] OR    Name: Bambi Garcia, : 1975, Age: 48 y.o., MRN: [id number], Sex: female    Diagnosis  Pre-op Diagnosis     * Leiomyoma of uterus, unspecified [D25.9] Post-op Diagnosis     * Leiomyoma of uterus, unspecified [D25.9]     Procedures  Laparoscopic Supracervical Hysterectomy  12066 - [place] LAPAROSCOPY SUPRACERVICAL HYSTERECTOMY 250 GM/<      Surgeons      * Andrew Mcnamara - Primary    Resident/Fellow/Other Assistant:  Surgeon(s) and Role:    Procedure Summary  Anesthesia: General  ASA: II  Anesthesia Staff: Anesthesiologist: Jaret Ta MD  C-AA: STAN Crocker  Estimated Blood Loss: 100mL  Intra-op Medications:   Medication Name Total Dose   aprepitant (Emend) capsule  - Omnicell Override Pull Cannot be calculated              Anesthesia Record               Intraprocedure I/O Totals       None           Specimen:   ID Type Source Tests Collected by Time   1 : Uterus Tissue UTERUS WITHOUT CERVIX SURGICAL PATHOLOGY EXAM Andrew Mcnamara MD 2024 [id number]        Staff:   Circulator: Bev EDWARD RN  Relief Circulator: Tania Don RN  Relief Scrub: Aurora Juárez  Scrub Person: Sue Dee; Allie Lane; Kenya Tirado         Drains and/or Catheters:   Urethral Catheter Non-latex 16 Fr. (Active)       Tourniquet Times:         Implants:     Findings: Globular uterus with adnexal adhesions    Indications: Bambi Garcia is an 48 y.o. female who is having surgery for Leiomyoma of uterus, unspecified [D25.9].     The patient was seen in the preoperative area. The risks, benefits, complications, treatment options, non-operative alternatives, expected recovery and outcomes were discussed with the patient. The possibilities of reaction to medication, pulmonary aspiration, injury to surrounding structures, bleeding, recurrent infection, the need for additional procedures, failure to  diagnose a condition, and creating a complication requiring transfusion or operation were discussed with the patient. The patient concurred with the proposed plan, giving informed consent.  The site of surgery was properly noted/marked if necessary per policy. The patient has been actively warmed in preoperative area. Preoperative antibiotics  Venous thrombosis prophylaxis     Procedure Details: Patient is morbidly obese she was brought to the operating room placed on the operating table and general anesthesia was given patient was placed in left in position and abdomen perineum and vagina scrubbed and draped usual manner Jj was inserted Valchev VLT CHV uterine manipulator was placed into the uterus surgeon moved to the abdominal region small subumbilical incision was made with a knife and carried all the way down to the fascia with Bonilla scissors fascia was open and presents and peritoneal cavity was confirmed 12 mm port was inserted and secured to step trocars using 5 mm ports were placed lateral to epigastric vessels patient was placed in Trendelenburg position and clearly there was a significant amount of and relatively small space to perform surgery uterus was elevated and clearly left adhesions from the sigmoid colon attachment to the abdominal wall with blocking completely view of the left side of the uterus there were dissected away from the colon was dissected away from the lateral wall sharply and then we are able to assess both fallopian tubes are very close to the ovaries so we decided not to proceed with removal of fallopian tubes  Complications: Good skin.  There is a premature menopause because of proximity to the ovary we proceeded with using bipolar cautery with knife gyrus grasper was coagulated transected the left utero-ovarian ligament left fallopian tube left round ligament Denley procedure done for the area of the bladder was seen to be pulled upward on the uterus and we dissected sharply  away from the uterus progressively weak more developed space yet it was unclear where the bladder was locatedFor this reason we is infused 200 cc normal saline into the bladder through the Jj catheter clamped the catheter that delineated our bladder we are able to create a bladder flap and deflected we then proceeded with coagulating transecting the right utero-ovarian ligament right fallopian tube right round ligament and broad ligament all the way down to the a uterocervical junction the visualization was suboptimal and we introduced a 5 mm corkscrew through the left port applied to the uterus and the tract retracted uterus to the left exposing the right side of the uterus and the right utero-ovarian junction at this point were able to identify uterine artery pedicles triply coagulated transected uterus became densely devoid of blood supply we proceeded with applying Sayra loop around uterocervical junction and using 110 W power we detached uterus from the cervix and hemostasis was satisfactory we proceeded with morcellation  Using Marifer morcellator without difficulty gas was allowed to escape all instruments were removed patient left operating room in stable condition skin was closed with 0 Vicryl figure-of-eight at the umbilicus and then 4-0 Vicryl and the skin Dermabond dressing was applied  Disposition: PACU - hemodynamically stable.  Condition: stable         Additional Details:     Attending Attestation: I was present and scrubbed for the entire procedure.    Andrew Mcnamara  Phone Number: [phone]

## 2024-01-16 NOTE — ANESTHESIA POSTPROCEDURE EVALUATION
Patient: Bambi Garcia    Procedure Summary       Date: 01/16/24 Room / Location: [facility] OR 09 / [facility] OR    Anesthesia Start: 1303 Anesthesia Stop: 1534    Procedure: Laparoscopic Supracervical Hysterectomy Diagnosis:       Leiomyoma of uterus, unspecified      (Leiomyoma of uterus, unspecified [D25.9])    Surgeons: Andrew Mcnamara MD Responsible Provider: Jaret Ta MD    Anesthesia Type: general ASA Status: 2            Anesthesia Type: general    Vitals Value Taken Time   /68 01/16/24 1534   Temp 36.2 01/16/24 1534   Pulse 67 01/16/24 1534   Resp 18 01/16/24 1534   SpO2 100 01/16/24 1534       Anesthesia Post Evaluation    Patient location during evaluation: PACU  Patient participation: complete - patient participated  Level of consciousness: awake and alert  Pain score: 0  Pain management: satisfactory to patient  Airway patency: patent  Cardiovascular status: stable  Respiratory status: room air  Hydration status: stable  Postoperative Nausea and Vomiting: none        No notable events documented.

## 2024-01-17 ASSESSMENT — PAIN SCALES - GENERAL: PAINLEVEL_OUTOF10: 5 - MODERATE PAIN

## 2024-01-22 LAB
LABORATORY COMMENT REPORT: NORMAL
PATH REPORT.FINAL DX SPEC: NORMAL
PATH REPORT.GROSS SPEC: NORMAL
PATH REPORT.RELEVANT HX SPEC: NORMAL
PATH REPORT.TOTAL CANCER: NORMAL

## 2024-02-01 ENCOUNTER — OFFICE VISIT (OUTPATIENT)
Dept: OBSTETRICS AND GYNECOLOGY | Facility: CLINIC | Age: 49
End: 2024-02-01
Payer: COMMERCIAL

## 2024-02-01 VITALS
HEIGHT: 69 IN | WEIGHT: 250 LBS | DIASTOLIC BLOOD PRESSURE: 72 MMHG | SYSTOLIC BLOOD PRESSURE: 118 MMHG | BODY MASS INDEX: 37.03 KG/M2

## 2024-02-01 DIAGNOSIS — Z48.89 POSTOPERATIVE OBSERVATION: Primary | ICD-10-CM

## 2024-02-01 PROCEDURE — 3078F DIAST BP <80 MM HG: CPT | Performed by: OBSTETRICS & GYNECOLOGY

## 2024-02-01 PROCEDURE — 99024 POSTOP FOLLOW-UP VISIT: CPT | Performed by: OBSTETRICS & GYNECOLOGY

## 2024-02-01 PROCEDURE — 3008F BODY MASS INDEX DOCD: CPT | Performed by: OBSTETRICS & GYNECOLOGY

## 2024-02-01 PROCEDURE — 3074F SYST BP LT 130 MM HG: CPT | Performed by: OBSTETRICS & GYNECOLOGY

## 2024-02-01 PROCEDURE — 1036F TOBACCO NON-USER: CPT | Performed by: OBSTETRICS & GYNECOLOGY

## 2024-02-01 ASSESSMENT — PAIN SCALES - GENERAL: PAINLEVEL: 0-NO PAIN

## 2024-02-01 NOTE — PROGRESS NOTES
Patient is here for postoperative visit 2 weeks after laparoscopic supracervical hysterectomy patient is recovering well denies any pain  Examination of the abdomen shows CVA negative abdomen soft without masses or tenderness all incisions are healing well without evidence of infection  Impression satisfactory postoperative recovery patient can resume driving  Additional postoperative follow-up 4 more weeks

## 2024-02-29 ENCOUNTER — APPOINTMENT (OUTPATIENT)
Dept: OBSTETRICS AND GYNECOLOGY | Facility: CLINIC | Age: 49
End: 2024-02-29
Payer: COMMERCIAL

## 2024-03-07 ENCOUNTER — APPOINTMENT (OUTPATIENT)
Dept: OBSTETRICS AND GYNECOLOGY | Facility: CLINIC | Age: 49
End: 2024-03-07
Payer: COMMERCIAL

## 2025-05-28 ENCOUNTER — APPOINTMENT (OUTPATIENT)
Dept: PRIMARY CARE | Facility: CLINIC | Age: 50
End: 2025-05-28
Payer: COMMERCIAL

## 2025-05-28 VITALS
HEART RATE: 64 BPM | DIASTOLIC BLOOD PRESSURE: 75 MMHG | HEIGHT: 69 IN | OXYGEN SATURATION: 98 % | WEIGHT: 239 LBS | SYSTOLIC BLOOD PRESSURE: 135 MMHG | BODY MASS INDEX: 35.4 KG/M2

## 2025-05-28 DIAGNOSIS — Z12.11 SCREEN FOR COLON CANCER: ICD-10-CM

## 2025-05-28 DIAGNOSIS — Z13.220 NEED FOR LIPID SCREENING: ICD-10-CM

## 2025-05-28 DIAGNOSIS — E66.01 OBESITY, MORBID (MULTI): ICD-10-CM

## 2025-05-28 DIAGNOSIS — Z23 NEED FOR PROPHYLACTIC VACCINATION AGAINST STREPTOCOCCUS PNEUMONIAE (PNEUMOCOCCUS): ICD-10-CM

## 2025-05-28 DIAGNOSIS — M76.61 NONINSERTIONAL TENDINOPATHY OF RIGHT ACHILLES TENDON: ICD-10-CM

## 2025-05-28 DIAGNOSIS — E11.65 UNCONTROLLED TYPE 2 DIABETES MELLITUS WITH HYPERGLYCEMIA: ICD-10-CM

## 2025-05-28 DIAGNOSIS — Z00.00 HEALTH CARE MAINTENANCE: Primary | ICD-10-CM

## 2025-05-28 DIAGNOSIS — Z23 NEED FOR VACCINE FOR TD (TETANUS-DIPHTHERIA): ICD-10-CM

## 2025-05-28 DIAGNOSIS — Z12.31 SCREENING MAMMOGRAM, ENCOUNTER FOR: ICD-10-CM

## 2025-05-28 DIAGNOSIS — L98.9 SKIN LESION: Primary | ICD-10-CM

## 2025-05-28 PROCEDURE — 90677 PCV20 VACCINE IM: CPT | Performed by: INTERNAL MEDICINE

## 2025-05-28 PROCEDURE — 4010F ACE/ARB THERAPY RXD/TAKEN: CPT | Performed by: INTERNAL MEDICINE

## 2025-05-28 PROCEDURE — 3075F SYST BP GE 130 - 139MM HG: CPT | Performed by: INTERNAL MEDICINE

## 2025-05-28 PROCEDURE — 99396 PREV VISIT EST AGE 40-64: CPT | Performed by: INTERNAL MEDICINE

## 2025-05-28 PROCEDURE — 1036F TOBACCO NON-USER: CPT | Performed by: INTERNAL MEDICINE

## 2025-05-28 PROCEDURE — 3008F BODY MASS INDEX DOCD: CPT | Performed by: INTERNAL MEDICINE

## 2025-05-28 PROCEDURE — 3078F DIAST BP <80 MM HG: CPT | Performed by: INTERNAL MEDICINE

## 2025-05-28 PROCEDURE — 90471 IMMUNIZATION ADMIN: CPT | Performed by: INTERNAL MEDICINE

## 2025-05-28 PROCEDURE — 90472 IMMUNIZATION ADMIN EACH ADD: CPT | Performed by: INTERNAL MEDICINE

## 2025-05-28 PROCEDURE — 90715 TDAP VACCINE 7 YRS/> IM: CPT | Performed by: INTERNAL MEDICINE

## 2025-05-28 RX ORDER — TIRZEPATIDE 2.5 MG/.5ML
2.5 INJECTION, SOLUTION SUBCUTANEOUS WEEKLY
Qty: 2 ML | Refills: 0 | Status: SHIPPED | OUTPATIENT
Start: 2025-05-28

## 2025-05-28 RX ORDER — OLMESARTAN MEDOXOMIL 20 MG/1
20 TABLET ORAL DAILY
Qty: 30 TABLET | Refills: 5 | Status: SHIPPED | OUTPATIENT
Start: 2025-05-28 | End: 2025-11-24

## 2025-05-28 RX ORDER — ATORVASTATIN CALCIUM 20 MG/1
20 TABLET, FILM COATED ORAL DAILY
Qty: 100 TABLET | Refills: 3 | Status: SHIPPED | OUTPATIENT
Start: 2025-05-28 | End: 2026-07-02

## 2025-05-28 RX ORDER — OLMESARTAN MEDOXOMIL 20 MG/1
20 TABLET ORAL DAILY
Qty: 30 TABLET | Refills: 5 | Status: SHIPPED | OUTPATIENT
Start: 2025-05-28 | End: 2025-05-28 | Stop reason: SDUPTHER

## 2025-05-28 RX ORDER — BLOOD-GLUCOSE SENSOR
EACH MISCELLANEOUS
Qty: 2 EACH | Refills: 11 | Status: SHIPPED | OUTPATIENT
Start: 2025-05-28 | End: 2025-05-28 | Stop reason: SDUPTHER

## 2025-05-28 RX ORDER — TIRZEPATIDE 2.5 MG/.5ML
2.5 INJECTION, SOLUTION SUBCUTANEOUS WEEKLY
Qty: 2 ML | Refills: 0 | Status: SHIPPED | OUTPATIENT
Start: 2025-05-28 | End: 2025-05-28 | Stop reason: SDUPTHER

## 2025-05-28 RX ORDER — METFORMIN HYDROCHLORIDE 500 MG/1
500 TABLET ORAL
Qty: 100 TABLET | Refills: 3 | Status: SHIPPED | OUTPATIENT
Start: 2025-05-28

## 2025-05-28 RX ORDER — METFORMIN HYDROCHLORIDE 500 MG/1
500 TABLET ORAL
Qty: 100 TABLET | Refills: 3 | Status: SHIPPED | OUTPATIENT
Start: 2025-05-28 | End: 2025-05-28 | Stop reason: SDUPTHER

## 2025-05-28 RX ORDER — ATORVASTATIN CALCIUM 20 MG/1
20 TABLET, FILM COATED ORAL DAILY
Qty: 100 TABLET | Refills: 3 | Status: SHIPPED | OUTPATIENT
Start: 2025-05-28 | End: 2025-05-28 | Stop reason: SDUPTHER

## 2025-05-28 RX ORDER — DICLOFENAC SODIUM 10 MG/G
4 GEL TOPICAL 4 TIMES DAILY
Qty: 100 G | Refills: 1 | Status: SHIPPED | OUTPATIENT
Start: 2025-05-28 | End: 2025-05-28 | Stop reason: SDUPTHER

## 2025-05-28 RX ORDER — BLOOD-GLUCOSE SENSOR
EACH MISCELLANEOUS
Qty: 2 EACH | Refills: 11 | Status: SHIPPED | OUTPATIENT
Start: 2025-05-28

## 2025-05-28 RX ORDER — DICLOFENAC SODIUM 10 MG/G
4 GEL TOPICAL 4 TIMES DAILY
Qty: 100 G | Refills: 1 | Status: SHIPPED | OUTPATIENT
Start: 2025-05-28

## 2025-05-28 NOTE — ASSESSMENT & PLAN NOTE
Check A1c and urine albumin.  Referral to pharmacy.  Advise low carbohydrate diet.  Start Mounjaro.  Start ARB.  Start statin  Follows with ophthalmology annually.  Pneumonia vaccine today.  Follow-up 3 months.  Start freestyle mariam 3.

## 2025-05-28 NOTE — PATIENT INSTRUCTIONS
We kindly ask that you take the lead and scheduling your referral appointments to ensure they align best with your availability by calling [phone].  For laboratory tests we encourage you to schedule an appointment online [email or web address] but walk-ins are available as well.  For radiology testing you can call [phone] or [phone] to schedule.  If for any reason you are having difficulty scheduling your appointments please feel free to reach out at our office by calling [phone] to assist further

## 2025-05-28 NOTE — PROGRESS NOTES
"Subjective   Patient ID: Bambi Garcia is a 49 y.o. female who presents for Annual Exam.          HPI     Patient is a 49-year-old female with past medical history of diabetes mellitus type 2 hypertension obesity GERD who presents to reestablish care and for wellness.    She has type 2 diabetes and has been taking metformin alone.  Last A1c checked almost 2 years ago.  No increased thirst or increased urination.  No numbness or tingling of the extremities.  Does not check her sugars at home.  She has been to the emergency room multiple times since last seen.  Other than the metformin she does not take any other medications    She is complaining of right Achilles pain.  Present for several months.  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    Due for mammogram and colonoscopy.    Does not adhere to a low carbohydrate diet.  Denies illicit substances or smoking.  Alcohol occasionally.    Review of Systems  Constitutional: No fever or chills, No Night Sweats  Eyes: No Blurry Vision or Eye sight problems  ENT: No Nasal Discharge, Hoarseness, sore throat  Cardiovascular: no chest pain, no palpitations and no syncope.   Respiratory: no cough, no shortness of breath during exertion and no shortness of breath at rest.   Gastrointestinal: no abdominal pain, no nausea and no vomiting.   : No vaginal discharge, burning with urination, no blood in urine or stools  Skin: No Skin rashes or Lesions  Neuro: No Headache, no dizziness or Numbness or tingling  Psych: No Anxiety, depression or sleeping problems  Heme: No Easy bleeding or brusing.     Objective   /75   Pulse 64   Ht 1.74 m (5' 8.5\")   Wt 108 kg (239 lb)   LMP 11/29/2023 (Exact Date)   SpO2 98%   BMI 35.81 kg/m²     Physical Exam  Constitutional: Alert and in no acute distress. Well developed, well nourished.   Head and Face: Head and face: Normal.    Eyes: Normal external exam. Pupils were equal in size, round, reactive " to light (PERRL) with normal accommodation and extraocular movements intact (EOMI).   Ears, Nose, Mouth, and Throat: External inspection of ears and nose: Normal.  Hearing: Normal.  Nasal mucosa, septum, and turbinates: Normal.  Lips, teeth, and gums: Normal.  Oropharynx: Normal.   Neck: No neck mass was observed. Supple. Thyroid not enlarged and there were no palpable thyroid nodules.   Cardiovascular: Heart rate and rhythm were normal, normal S1 and S2. Pedal pulses: Normal. No peripheral edema.   Pulmonary: No respiratory distress. Clear bilateral breath sounds.   Breast: Normal Appearance, No Masses or lumps palpated  Abdomen: Soft nontender; no abdominal mass palpated. Normal bowel sounds. No organomegaly.   : Deferred   Musculoskeletal: No joint swelling seen, normal movements of all extremities. Range of motion: Normal.  Muscle strength/tone: Normal.    Skin: Normal skin color and pigmentation, normal skin turgor, and no rash.   Neurologic: Deep tendon reflexes were 2+ and symmetric.   Psychiatric: Judgment and insight: Intact. Mood and affect: Normal.  Lymphatic: No cervical lymphadenopathy. Palpation of lymph nodes in axillae: Normal.  Palpation of lymph nodes in groin: Normal.    Lab Results   Component Value Date    WBC 10.7 12/28/2023    HGB 11.7 (L) 12/28/2023    HCT 37.3 12/28/2023     12/28/2023    CHOL 211 (H) 11/30/2022    TRIG 96 11/30/2022    HDL 57.8 11/30/2022    ALT 11 11/30/2022    AST 14 11/30/2022     11/30/2022    K 4.5 11/30/2022     11/30/2022    CREATININE 0.72 11/30/2022    BUN 12 11/30/2022    CO2 23 11/30/2022    TSH 1.61 01/21/2021    HGBA1C 9.7 (A) 04/26/2023       XR foot left 3+ views  Narrative: Interpreted By:  Nurys Mack,   STUDY:  XR FOOT LEFT 3+ VIEWS;  10/19/2023 3:25 pm      INDICATION:  Signs/Symptoms:Fifth digit pain.      COMPARISON:  None.      ACCESSION NUMBER(S):  [id number]      ORDERING CLINICIAN:  COLT QUACH      FINDINGS:  Plate and  multiple screws are seen transfixing the distal fibula. A  single screw seen transfixing the medial malleolus. The visualized  hardware is intact.      Calcaneal spurs are identified.          No acute fracture or dislocation is seen.      No soft tissue swelling is identified. No erosions or periosteal  reaction is seen.      No radiopaque foreign bodies are seen.      Impression: No acute or healing fracture of the foot.      Calcaneal spurs.      Postsurgical changes in the ankle, as described.          MACRO:  None      Signed by: Nurys Mack 10/23/2023 7:59 PM  Dictation workstation:   [id number]  XR foot right 3+ views  Narrative: Interpreted By:  Nurys Mack,   STUDY:  XR FOOT RIGHT 3+ VIEWS;  10/19/2023 3:25 pm      INDICATION:  Signs/Symptoms:Hallux valgus.      COMPARISON:  None.      ACCESSION NUMBER(S):  [id number]      ORDERING CLINICIAN:  COLT QUACH      FINDINGS:  Mild hallucis valgus deformity is seen with mild narrowing of the 1st  metatarsophalangeal joint. Calcaneal spurs identified. No acute  fracture or dislocation is seen. No erosions or periosteal reaction  is noted.      Impression: Mild hallucis valgus deformity.      Calcaneal spur.          MACRO:  None      Signed by: Nurys Mack 10/23/2023 7:58 PM  Dictation workstation:   [id number]      Assessment/Plan   Problem List Items Addressed This Visit           ICD-10-CM    Uncontrolled type 2 diabetes mellitus with hyperglycemia - Primary E11.65    Relevant Medications    FreeStyle Sonny 3 Sensor device    metFORMIN (Glucophage) 500 mg tablet    tirzepatide (Mounjaro) 2.5 mg/0.5 mL pen injector    olmesartan (BENIcar) 20 mg tablet    Other Relevant Orders    Referral to Clinical Pharmacy    Hemoglobin A1c    Albumin-Creatinine Ratio, Urine Random    Follow Up In [facility] - PCP - Established     Other Visit Diagnoses         Codes      Screening mammogram, encounter for     Z12.31    Relevant Orders    BI mammo  bilateral screening tomosynthesis      Screen for colon cancer     Z12.11    Relevant Orders    Cologuard® colon cancer screening      Need for prophylactic vaccination against Streptococcus pneumoniae (pneumococcus)     Z23    Relevant Orders    Pneumococcal conjugate vaccine, 20-valent (PREVNAR 20) (Completed)      Need for vaccine for Td (tetanus-diphtheria)     Z23      Need for lipid screening     Z13.220    Relevant Orders    Lipid panel      Health care maintenance     Z00.00    Relevant Orders    CBC    Comprehensive metabolic panel    TSH with reflex to Free T4 if abnormal      Noninsertional tendinopathy of right Achilles tendon     M76.61    Relevant Medications    diclofenac sodium (Voltaren) 1 % gel    Other Relevant Orders    Referral to Physical Therapy              Dear Bambi Garcia     It was my pleasure to take care of you today in the office. Below are the things we discussed today:    1. 1. Immunizations: Yearly Flu shot is recommended.          Patient plans on having the shingles vaccine at the local pharmacy.  Tetanus and pneumonia vaccine today.    2. Blood Work: Ordered  3. Seen your dentist twice a year  4. Yearly Eye exam is recommended    5. BMI: 35.8  6: Diet recommendations:   Eat Clean, Try to have as many home cooked meals as possible  Avoid processed foods which contain excess calories, sugar, and sodium.    7. Exercise recommendations:   150 minutes a week to maintain your weight     If you have to loose weight, you need a better diet and exercise plan.     8. Supplements recommended:  a - Calcium 600 mg up to twice a day to get a total of 1200 mg. Each 8 oz of milk or yogurt or 1 oz of cheese, 1 Banana, 1 serving of green Leafy vegetable has about 300 mg of Calcium, so you may subtract that amount. Calcium citrate is the only acceptable supplement to take if you take an acid suppressing medication like Prilosec; otherwise Calcium carbonate is acceptable too (It can cause  Constipation).   b - Vitamin D - 2000 IU daily     9. Please get your Living will / Advance directive completed if you do not have one already. Please make sure our office has a copy of the latest one.     10. Colonoscopy: Cologuard ordered  11. Mammogram: Ordered  12. PAP: Advise follow-up with gynecology  13. DEXA: N/A  14: Skin Check: Please see Dermatology once a year for a Skin Check.     15.  Follow-up 3 months    Follow up in one year for a Physical. Please call the office before your Physical to see if you need blood work completed prior to your physical.     Please call me if any questions arise from now until your next visit. I will call you after I am done seeing patients. A Doctor is always available by phone when the office is closed. Please feel free to call for help with any problem that you feel shouldn't wait until the office re-opens.

## 2025-05-29 LAB
ALBUMIN SERPL-MCNC: 4.1 G/DL (ref 3.6–5.1)
ALP SERPL-CCNC: 61 U/L (ref 37–153)
ALT SERPL-CCNC: 13 U/L (ref 6–29)
ANION GAP SERPL CALCULATED.4IONS-SCNC: 10 MMOL/L (CALC) (ref 7–17)
AST SERPL-CCNC: 14 U/L (ref 10–35)
BILIRUB SERPL-MCNC: 0.4 MG/DL (ref 0.2–1.2)
BUN SERPL-MCNC: 15 MG/DL (ref 7–25)
CALCIUM SERPL-MCNC: 9.5 MG/DL (ref 8.6–10.4)
CHLORIDE SERPL-SCNC: 102 MMOL/L (ref 98–110)
CHOLEST SERPL-MCNC: 227 MG/DL
CHOLEST/HDLC SERPL: 3.2 (CALC)
CO2 SERPL-SCNC: 24 MMOL/L (ref 20–32)
CREAT SERPL-MCNC: 0.68 MG/DL (ref 0.5–1.03)
EGFRCR SERPLBLD CKD-EPI 2021: 106 ML/MIN/1.73M2
ERYTHROCYTE [DISTWIDTH] IN BLOOD BY AUTOMATED COUNT: 13.5 % (ref 11–15)
EST. AVERAGE GLUCOSE BLD GHB EST-MCNC: 301 MG/DL
EST. AVERAGE GLUCOSE BLD GHB EST-SCNC: 16.6 MMOL/L
GLUCOSE SERPL-MCNC: 322 MG/DL (ref 65–99)
HBA1C MFR BLD: 12.1 %
HCT VFR BLD AUTO: 46.9 % (ref 35–45)
HDLC SERPL-MCNC: 70 MG/DL
HGB BLD-MCNC: 14.8 G/DL (ref 11.7–15.5)
LDLC SERPL CALC-MCNC: 136 MG/DL (CALC)
MCH RBC QN AUTO: 28.8 PG (ref 27–33)
MCHC RBC AUTO-ENTMCNC: 31.6 G/DL (ref 32–36)
MCV RBC AUTO: 91.2 FL (ref 80–100)
NONHDLC SERPL-MCNC: 157 MG/DL (CALC)
PLATELET # BLD AUTO: 372 THOUSAND/UL (ref 140–400)
PMV BLD REES-ECKER: 10.2 FL (ref 7.5–12.5)
POTASSIUM SERPL-SCNC: 4.8 MMOL/L (ref 3.5–5.3)
PROT SERPL-MCNC: 7.3 G/DL (ref 6.1–8.1)
RBC # BLD AUTO: 5.14 MILLION/UL (ref 3.8–5.1)
SODIUM SERPL-SCNC: 136 MMOL/L (ref 135–146)
TRIGL SERPL-MCNC: 103 MG/DL
TSH SERPL-ACNC: 1.22 MIU/L
WBC # BLD AUTO: 7.8 THOUSAND/UL (ref 3.8–10.8)

## 2025-06-03 ENCOUNTER — APPOINTMENT (OUTPATIENT)
Dept: RADIOLOGY | Facility: CLINIC | Age: 50
End: 2025-06-03
Payer: COMMERCIAL

## 2025-06-03 VITALS — BODY MASS INDEX: 35.4 KG/M2 | WEIGHT: 239 LBS | HEIGHT: 69 IN

## 2025-06-03 DIAGNOSIS — Z12.31 SCREENING MAMMOGRAM, ENCOUNTER FOR: ICD-10-CM

## 2025-06-03 PROCEDURE — 77063 BREAST TOMOSYNTHESIS BI: CPT | Performed by: RADIOLOGY

## 2025-06-03 PROCEDURE — 77067 SCR MAMMO BI INCL CAD: CPT | Performed by: RADIOLOGY

## 2025-06-03 PROCEDURE — 77067 SCR MAMMO BI INCL CAD: CPT

## 2025-06-10 ENCOUNTER — TELEPHONE (OUTPATIENT)
Dept: PHARMACY | Facility: HOSPITAL | Age: 50
End: 2025-06-10
Payer: COMMERCIAL

## 2025-06-10 DIAGNOSIS — E11.65 UNCONTROLLED TYPE 2 DIABETES MELLITUS WITH HYPERGLYCEMIA: Primary | ICD-10-CM

## 2025-06-10 RX ORDER — BLOOD SUGAR DIAGNOSTIC
STRIP MISCELLANEOUS
Qty: 100 STRIP | Refills: 1 | Status: SHIPPED | OUTPATIENT
Start: 2025-06-10

## 2025-06-10 RX ORDER — DEXTROSE 4 G
TABLET,CHEWABLE ORAL
Qty: 1 EACH | Refills: 0 | Status: SHIPPED | OUTPATIENT
Start: 2025-06-10

## 2025-06-10 RX ORDER — LANCETS
EACH MISCELLANEOUS
Qty: 100 EACH | Refills: 1 | Status: SHIPPED | OUTPATIENT
Start: 2025-06-10

## 2025-06-10 NOTE — TELEPHONE ENCOUNTER
Contacted patient to schedule with clinical pharmacy. Referral had been canceled since schedulers could not contact after 3 attempts. Patient reports Mounjaro and Sonny 3 sensors were too expensive and she hasn't started medication or checking blood sugar. Patient was driving so appt was made for 6/16 at 9:20 to discuss diabetes and medications further. In the meantime testing supplies will be sent to [facility].

## 2025-06-13 NOTE — PROGRESS NOTES
Clinical Pharmacy Appointment    Patient ID: Bambi Garcia is a 49 y.o. female who presents for No chief complaint on file..    Pt is here for First appointment.     Referring Provider: Jake Santos DO  PCP: Jake Santos DO  Last visit with PCP: 5/28/25   Next visit with PCP: 8/28/25    Subjective   Medication Reconciliation:  Changed: ***  Added: ***  Discontinued: ***    Drug Interactions  No relevant drug interactions were noted.    Medication System Management  Patient's preferred pharmacy: [facility]  Adherence/Organization: ***  Affordability/Accessibility: Mounjaro cost    HPI  DIABETES MELLITUS TYPE 2:    Diagnosed with diabetes:  ***.   Known diabetic complications: hyperglycemia.  Does patient follow with Endocrinology: No  Last optometry exam: ***  Most recent visit in Podiatry: ***-- patient {DM reports/denies:56245} sores or cuts on feet today      Current diabetic medications include:  Metformin 500 mg daily  Mounjaro 2.5 mg weekly    Clarifications to above regimen: ***  Adverse Effects: ***    Past diabetic medications include:      Glucose Readings:  Glucometer/CGM Type: ***  Patient tests BG *** times per day    Current home BG readings (mg/dL): ***   Previous home BG readings (mg/dL): ***    Any episodes of hypoglycemia? {YES/NO/NA:42697}.  Did patient treat episode of hypoglycemia appropriately? {YES/NO/NA:22850}  Does the patient have a prescription for ready-to-use Glucagon? {Diabetes Hypoglycemia:17067}    Lifestyle:  Diet: *** meals/day. ***  BK: ***  LN: ***  DN: ***  Snacks: ***  Drinks: ***  Exercise: {exercise:94021}  ***  Is limited by: {Exercise limitations:51418}  Tobacco history: Never    Secondary Prevention:  Statin? Yes  ACE-I/ARB? Yes  Aspirin? No    Pertinent PMH Review:  PMH of Pancreatitis: {YES,NO:36126}  PMH of Retinopathy: {YES,NO:63417}  PMH of Urinary Tract Infections: No  PMH of MTC: No  PMH of MEN2: No  UACR/EGFR in last year?: No  Albumin/Creatine Ratio   Date  Value Ref Range Status   04/26/2023 15.5 0.0 - 30.0 ug/mg crt Final   11/30/2022 46.6 (H) 0.0 - 30.0 ug/mg crt Final   12/08/2021 SEE COMMENT 0.0 - 30.0 ug/mg crt Final     Comment:     One or more analytes used in this calculation   is outside of the analytical measurement range.  Calculation cannot be performed.         Immunizations:  Influenza? Yes  COVID? Yes  Pneumonia? Yes  Shingles? No  Hepatitis B? No      Patient Assistance Program (PAP)    Application for program to be submitted for the following medications: Mounjaro    Prescription Insurance:  Yes   Paid Test Claim:  Yes   County of Permanent Address:  [facility]   Members of Household:  ***   Files Taxes:  Yes     Patient will be {financialpaperwork:35138}    Patient verbally reports monthly or yearly income which is less than 400% federal poverty level    Patient aware this process may take up to 6 weeks.     If approved medication must be filled through [facility] and MEDICATION WILL BE MAILED TO PATIENT.        Objective   Allergies[1]  Social History     Social History Narrative    Not on file      Medication Review  Current Outpatient Medications   Medication Instructions    atorvastatin (LIPITOR) 20 mg, oral, Daily    blood sugar diagnostic (Accu-Chek Guide test strips) Test blood sugar once daily    blood-glucose meter (Accu-Chek Guide Glucose Meter) misc Test blood sugar once daily    diclofenac sodium (VOLTAREN) 4 g, Topical, 4 times daily    FreeStyle Sonny 3 Sensor device Take sugars 3 times daily    lancets misc Test blood sugar once daily    metFORMIN (GLUCOPHAGE) 500 mg, oral, Daily with breakfast    Mounjaro 2.5 mg, subcutaneous, Weekly    olmesartan (BENICAR) 20 mg, oral, Daily      Vitals  BP Readings from Last 2 Encounters:   05/28/25 135/75   02/01/24 118/72     BMI Readings from Last 1 Encounters:   06/03/25 35.81 kg/m²      Labs  A1C  Lab Results   Component Value Date    HGBA1C 12.1 (H) 05/28/2025    HGBA1C 9.7 (A)  04/26/2023    HGBA1C 9.3 (A) 11/30/2022     BMP  Lab Results   Component Value Date    CALCIUM 9.5 05/28/2025     05/28/2025    K 4.8 05/28/2025    CO2 24 05/28/2025     05/28/2025    BUN 15 05/28/2025    CREATININE 0.68 05/28/2025    EGFR 106 05/28/2025     LFTs  Lab Results   Component Value Date    ALT 13 05/28/2025    AST 14 05/28/2025    ALKPHOS 61 05/28/2025    BILITOT 0.4 05/28/2025     FLP  Lab Results   Component Value Date    TRIG 103 05/28/2025    CHOL 227 (H) 05/28/2025    LDLF 134 (H) 11/30/2022    LDLCALC 136 (H) 05/28/2025    HDL 70 05/28/2025     Urine Microalbumin  Lab Results   Component Value Date    MICROALBCREA 15.5 04/26/2023     Weight Management  Wt Readings from Last 3 Encounters:   06/03/25 108 kg (239 lb)   05/28/25 108 kg (239 lb)   02/01/24 113 kg (250 lb)      There is no height or weight on file to calculate BMI.     Assessment/Plan   Problem List Items Addressed This Visit    None      Clinical Pharmacist follow-up: ***, Telehealth visit  Patient is not followed in CCM.   Continue all meds under the continuation of care with the referring provider and clinical pharmacy team.    Thank you,  Irlanda Tan  Clinical Pharmacist  [phone]    Verbal consent to manage patient's drug therapy was obtained from the patient. They were informed they may decline to participate or withdraw from participation in pharmacy services at any time.         [1] No Known Allergies

## 2025-06-16 ENCOUNTER — APPOINTMENT (OUTPATIENT)
Dept: PHARMACY | Facility: HOSPITAL | Age: 50
End: 2025-06-16
Payer: COMMERCIAL

## 2025-06-19 ENCOUNTER — APPOINTMENT (OUTPATIENT)
Dept: SURGERY | Facility: CLINIC | Age: 50
End: 2025-06-19
Payer: COMMERCIAL

## 2025-06-20 ENCOUNTER — APPOINTMENT (OUTPATIENT)
Facility: CLINIC | Age: 50
End: 2025-06-20
Payer: COMMERCIAL

## 2025-08-28 ENCOUNTER — APPOINTMENT (OUTPATIENT)
Dept: PRIMARY CARE | Facility: CLINIC | Age: 50
End: 2025-08-28
Payer: COMMERCIAL

## (undated) DEVICE — CAUTERY, PENCIL, PUSH BUTTON, SMOKE EVAC, 70MM

## (undated) DEVICE — SHEAR, W/UNIPOLAR CAUTERY, ENDOSHEAR, 5 MM

## (undated) DEVICE — LOOP, ENDOSCOPIC MONOPOLAR

## (undated) DEVICE — GOWN, SURGICAL, SMARTGOWN, XLARGE, STERILE

## (undated) DEVICE — TUBESET, HIGH FLOW II, HEATED, W/RTP, PNEUMO SURE

## (undated) DEVICE — Device

## (undated) DEVICE — GLOVE, SURGICAL, PROTEXIS PI MICRO, 7.5, PF, LF

## (undated) DEVICE — ADHESIVE, SKIN, LIQUIBAND EXCEED

## (undated) DEVICE — TROCAR SYSTEM, BALLOON, KII GELPORT, 12 X 100MM

## (undated) DEVICE — SUTURE, VICRYL, 0, 27 IN, UR-6, VIOLET

## (undated) DEVICE — SOLUTION, INJECTION, USP, SODIUM CHLORIDE 0.9%, .9, NACL, 1000 ML, BAG

## (undated) DEVICE — DRAPE PACK, LAVH, W/ATTACHED LEGGINGS, W/POUCH, 100 X 114 IN, LF, STERILE

## (undated) DEVICE — SOLUTION, IRRIGATION, SODIUM CHLORIDE 0.9%, 1000 ML, POUR BOTTLE

## (undated) DEVICE — SUTURE, VICRYL, 4-0, 18 IN, PS2, UNDYED

## (undated) DEVICE — GLOVE, SURGICAL, PROTEXIS PI W/NEU-THERA, 7.5, PF, LF

## (undated) DEVICE — TOWEL, SURGICAL, NEURO, O/R, 16 X 26, BLUE, STERILE

## (undated) DEVICE — ENDO, PORT VERSASTEP 5MM

## (undated) DEVICE — PUMP, STRYKERFLOW 2 & HANDPIECE W/10FT. IRRIGATION TUBING

## (undated) DEVICE — NEEDLE, INSUFFLATION, 14 G, 100 MM

## (undated) DEVICE — CARE KIT, LAPAROSCOPIC, ADVANCED

## (undated) DEVICE — TRAY, SURESTEP, URINE METER, 16FR, COMPLETE, W/STATLOCK

## (undated) DEVICE — MORCELLATOR, LINA XCISE, CORDLESS, W/OBTURATOR, 15MM, SINGLE USE

## (undated) DEVICE — SOLUTION, SCRUB EXIDINE, 4% CHG, 4 OZ

## (undated) DEVICE — PREP TRAY, VAGINAL

## (undated) DEVICE — CORD, MONOPOLAR, HIGH FREQUENCY, W/8MM PLUG F/VALLEYLAB, 8FT/244CM, STRL

## (undated) DEVICE — POSITIONING KIT, PAGAZZI, PINK PAD XL, W/ ARM AND HEAD REST